# Patient Record
Sex: FEMALE | Race: WHITE | Employment: OTHER | URBAN - METROPOLITAN AREA
[De-identification: names, ages, dates, MRNs, and addresses within clinical notes are randomized per-mention and may not be internally consistent; named-entity substitution may affect disease eponyms.]

---

## 2017-02-10 ENCOUNTER — HOSPITAL ENCOUNTER (OUTPATIENT)
Dept: MAMMOGRAPHY | Age: 72
Discharge: HOME | End: 2017-02-10
Payer: MEDICARE

## 2017-02-10 DIAGNOSIS — Z12.39 BREAST CANCER SCREENING: ICD-10-CM

## 2017-02-10 PROCEDURE — 7025F PT INFOSYS ALARM 4 NXT MAMMO: CPT | Performed by: RADIOLOGY

## 2017-02-10 PROCEDURE — 77063 BREAST TOMOSYNTHESIS BI: CPT | Performed by: RADIOLOGY

## 2017-02-10 PROCEDURE — G0202 SCR MAMMO BI INCL CAD: HCPCS | Performed by: RADIOLOGY

## 2017-02-10 PROCEDURE — G0202 SCR MAMMO BI INCL CAD: HCPCS

## 2018-02-14 ENCOUNTER — HOSPITAL ENCOUNTER (OUTPATIENT)
Dept: MAMMOGRAPHY | Age: 73
Discharge: HOME | End: 2018-02-16
Payer: MEDICARE

## 2018-02-14 DIAGNOSIS — Z12.39 BREAST CANCER SCREENING: ICD-10-CM

## 2018-02-14 PROCEDURE — 77067 SCR MAMMO BI INCL CAD: CPT

## 2018-05-04 ENCOUNTER — APPOINTMENT (OUTPATIENT)
Dept: GENERAL RADIOLOGY | Age: 73
End: 2018-05-04
Payer: MEDICARE

## 2018-05-04 ENCOUNTER — HOSPITAL ENCOUNTER (EMERGENCY)
Age: 73
Discharge: HOME | End: 2018-05-04
Attending: EMERGENCY MEDICINE
Payer: MEDICARE

## 2018-05-04 VITALS
SYSTOLIC BLOOD PRESSURE: 165 MMHG | TEMPERATURE: 98.1 F | BODY MASS INDEX: 31.65 KG/M2 | OXYGEN SATURATION: 96 % | WEIGHT: 172 LBS | HEIGHT: 62 IN | DIASTOLIC BLOOD PRESSURE: 98 MMHG | RESPIRATION RATE: 18 BRPM | HEART RATE: 93 BPM

## 2018-05-04 DIAGNOSIS — M54.31 SCIATICA OF RIGHT SIDE: Primary | ICD-10-CM

## 2018-05-04 PROCEDURE — 99283 EMERGENCY DEPT VISIT LOW MDM: CPT

## 2018-05-04 PROCEDURE — 73502 X-RAY EXAM HIP UNI 2-3 VIEWS: CPT

## 2018-05-04 PROCEDURE — 6370000000 HC RX 637 (ALT 250 FOR IP): Performed by: EMERGENCY MEDICINE

## 2018-05-04 PROCEDURE — 72100 X-RAY EXAM L-S SPINE 2/3 VWS: CPT

## 2018-05-04 RX ORDER — HYDROCODONE BITARTRATE AND ACETAMINOPHEN 5; 325 MG/1; MG/1
1 TABLET ORAL ONCE
Status: COMPLETED | OUTPATIENT
Start: 2018-05-04 | End: 2018-05-04

## 2018-05-04 RX ORDER — LIDOCAINE 50 MG/G
1 PATCH TOPICAL DAILY
Qty: 30 PATCH | Refills: 0 | Status: ON HOLD | OUTPATIENT
Start: 2018-05-04 | End: 2018-07-20

## 2018-05-04 RX ORDER — HYDROCODONE BITARTRATE AND ACETAMINOPHEN 5; 325 MG/1; MG/1
1 TABLET ORAL EVERY 6 HOURS PRN
Qty: 10 TABLET | Refills: 0 | Status: SHIPPED | OUTPATIENT
Start: 2018-05-04 | End: 2018-05-09

## 2018-05-04 RX ADMIN — HYDROCODONE BITARTRATE AND ACETAMINOPHEN 1 TABLET: 5; 325 TABLET ORAL at 08:24

## 2018-05-04 ASSESSMENT — PAIN DESCRIPTION - PAIN TYPE: TYPE: ACUTE PAIN

## 2018-05-04 ASSESSMENT — ENCOUNTER SYMPTOMS
ABDOMINAL PAIN: 0
BACK PAIN: 1

## 2018-05-04 ASSESSMENT — PAIN DESCRIPTION - LOCATION: LOCATION: HIP

## 2018-05-04 ASSESSMENT — PAIN SCALES - GENERAL
PAINLEVEL_OUTOF10: 7
PAINLEVEL_OUTOF10: 4
PAINLEVEL_OUTOF10: 7

## 2018-05-04 ASSESSMENT — PAIN DESCRIPTION - PROGRESSION: CLINICAL_PROGRESSION: GRADUALLY IMPROVING

## 2018-05-04 ASSESSMENT — PAIN DESCRIPTION - ORIENTATION: ORIENTATION: RIGHT

## 2018-07-19 ENCOUNTER — HOSPITAL ENCOUNTER (OUTPATIENT)
Age: 73
Setting detail: OBSERVATION
LOS: 1 days | Discharge: HOME | End: 2018-07-20
Attending: EMERGENCY MEDICINE | Admitting: INTERNAL MEDICINE
Payer: MEDICARE

## 2018-07-19 ENCOUNTER — APPOINTMENT (OUTPATIENT)
Dept: CT IMAGING | Age: 73
End: 2018-07-19
Payer: MEDICARE

## 2018-07-19 DIAGNOSIS — R10.13 EPIGASTRIC PAIN: Primary | ICD-10-CM

## 2018-07-19 DIAGNOSIS — D72.828 OTHER ELEVATED WHITE BLOOD CELL (WBC) COUNT: ICD-10-CM

## 2018-07-19 LAB
-: ABNORMAL
ABSOLUTE EOS #: 0.1 K/UL (ref 0–0.4)
ABSOLUTE IMMATURE GRANULOCYTE: ABNORMAL K/UL (ref 0–0.3)
ABSOLUTE LYMPH #: 2.5 K/UL (ref 1–4.8)
ABSOLUTE MONO #: 1 K/UL (ref 0.1–1.2)
ALBUMIN SERPL-MCNC: 4.6 G/DL (ref 3.5–5.2)
ALBUMIN/GLOBULIN RATIO: 1.5 (ref 1–2.5)
ALP BLD-CCNC: 74 U/L (ref 35–104)
ALT SERPL-CCNC: 113 U/L (ref 5–33)
AMORPHOUS: ABNORMAL
AMYLASE: 46 U/L (ref 28–100)
ANION GAP SERPL CALCULATED.3IONS-SCNC: 14 MMOL/L (ref 9–17)
AST SERPL-CCNC: 70 U/L
BACTERIA: ABNORMAL
BASOPHILS # BLD: 0 % (ref 0–2)
BASOPHILS ABSOLUTE: 0.1 K/UL (ref 0–0.2)
BILIRUB SERPL-MCNC: 0.35 MG/DL (ref 0.3–1.2)
BILIRUBIN DIRECT: 0.1 MG/DL
BILIRUBIN URINE: NEGATIVE
BILIRUBIN, INDIRECT: 0.25 MG/DL (ref 0–1)
BUN BLDV-MCNC: 15 MG/DL (ref 8–23)
BUN/CREAT BLD: ABNORMAL (ref 9–20)
CALCIUM SERPL-MCNC: 9.6 MG/DL (ref 8.6–10.4)
CASTS UA: ABNORMAL /LPF
CHLORIDE BLD-SCNC: 103 MMOL/L (ref 98–107)
CO2: 24 MMOL/L (ref 20–31)
COLOR: YELLOW
COMMENT UA: ABNORMAL
CREAT SERPL-MCNC: 0.85 MG/DL (ref 0.5–0.9)
CRYSTALS, UA: ABNORMAL /HPF
DIFFERENTIAL TYPE: ABNORMAL
EKG ATRIAL RATE: 115 BPM
EKG P AXIS: 34 DEGREES
EKG P-R INTERVAL: 126 MS
EKG Q-T INTERVAL: 314 MS
EKG QRS DURATION: 66 MS
EKG QTC CALCULATION (BAZETT): 434 MS
EKG R AXIS: 5 DEGREES
EKG T AXIS: 44 DEGREES
EKG VENTRICULAR RATE: 115 BPM
EOSINOPHILS RELATIVE PERCENT: 0 % (ref 1–4)
EPITHELIAL CELLS UA: ABNORMAL /HPF (ref 0–5)
GFR AFRICAN AMERICAN: >60 ML/MIN
GFR NON-AFRICAN AMERICAN: >60 ML/MIN
GFR SERPL CREATININE-BSD FRML MDRD: ABNORMAL ML/MIN/{1.73_M2}
GFR SERPL CREATININE-BSD FRML MDRD: ABNORMAL ML/MIN/{1.73_M2}
GLOBULIN: ABNORMAL G/DL (ref 1.5–3.8)
GLUCOSE BLD-MCNC: 114 MG/DL (ref 70–99)
GLUCOSE URINE: NEGATIVE
HCT VFR BLD CALC: 48.3 % (ref 36–46)
HEMOGLOBIN: 16.6 G/DL (ref 12–16)
IMMATURE GRANULOCYTES: ABNORMAL %
KETONES, URINE: NEGATIVE
LACTIC ACID: 1.8 MMOL/L (ref 0.5–2.2)
LEUKOCYTE ESTERASE, URINE: NEGATIVE
LIPASE: 58 U/L (ref 13–60)
LYMPHOCYTES # BLD: 14 % (ref 24–44)
MCH RBC QN AUTO: 32.8 PG (ref 26–34)
MCHC RBC AUTO-ENTMCNC: 34.3 G/DL (ref 31–37)
MCV RBC AUTO: 95.6 FL (ref 80–100)
MONOCYTES # BLD: 5 % (ref 2–11)
MUCUS: ABNORMAL
NITRITE, URINE: NEGATIVE
NRBC AUTOMATED: ABNORMAL PER 100 WBC
OTHER OBSERVATIONS UA: ABNORMAL
PDW BLD-RTO: 13.7 % (ref 12.5–15.4)
PH UA: 6 (ref 5–8)
PLATELET # BLD: 249 K/UL (ref 140–450)
PLATELET ESTIMATE: ABNORMAL
PMV BLD AUTO: 9.1 FL (ref 6–12)
POTASSIUM SERPL-SCNC: 4.1 MMOL/L (ref 3.7–5.3)
PROTEIN UA: NEGATIVE
RBC # BLD: 5.05 M/UL (ref 4–5.2)
RBC # BLD: ABNORMAL 10*6/UL
RBC UA: ABNORMAL /HPF (ref 0–2)
RENAL EPITHELIAL, UA: ABNORMAL /HPF
SEG NEUTROPHILS: 81 % (ref 36–66)
SEGMENTED NEUTROPHILS ABSOLUTE COUNT: 14.3 K/UL (ref 1.8–7.7)
SODIUM BLD-SCNC: 141 MMOL/L (ref 135–144)
SPECIFIC GRAVITY UA: 1.02 (ref 1–1.03)
TOTAL PROTEIN: 7.6 G/DL (ref 6.4–8.3)
TRICHOMONAS: ABNORMAL
TROPONIN INTERP: NORMAL
TROPONIN T: <0.03 NG/ML
TURBIDITY: CLEAR
URINE HGB: ABNORMAL
UROBILINOGEN, URINE: NORMAL
WBC # BLD: 17.9 K/UL (ref 3.5–11)
WBC # BLD: ABNORMAL 10*3/UL
WBC UA: ABNORMAL /HPF (ref 0–5)
YEAST: ABNORMAL

## 2018-07-19 PROCEDURE — 36415 COLL VENOUS BLD VENIPUNCTURE: CPT

## 2018-07-19 PROCEDURE — 80076 HEPATIC FUNCTION PANEL: CPT

## 2018-07-19 PROCEDURE — 6360000002 HC RX W HCPCS: Performed by: EMERGENCY MEDICINE

## 2018-07-19 PROCEDURE — 84484 ASSAY OF TROPONIN QUANT: CPT

## 2018-07-19 PROCEDURE — 83690 ASSAY OF LIPASE: CPT

## 2018-07-19 PROCEDURE — 82150 ASSAY OF AMYLASE: CPT

## 2018-07-19 PROCEDURE — 81001 URINALYSIS AUTO W/SCOPE: CPT

## 2018-07-19 PROCEDURE — 99284 EMERGENCY DEPT VISIT MOD MDM: CPT

## 2018-07-19 PROCEDURE — 96374 THER/PROPH/DIAG INJ IV PUSH: CPT

## 2018-07-19 PROCEDURE — 96375 TX/PRO/DX INJ NEW DRUG ADDON: CPT

## 2018-07-19 PROCEDURE — 2580000003 HC RX 258: Performed by: EMERGENCY MEDICINE

## 2018-07-19 PROCEDURE — 85025 COMPLETE CBC W/AUTO DIFF WBC: CPT

## 2018-07-19 PROCEDURE — 83605 ASSAY OF LACTIC ACID: CPT

## 2018-07-19 PROCEDURE — 80048 BASIC METABOLIC PNL TOTAL CA: CPT

## 2018-07-19 PROCEDURE — 74176 CT ABD & PELVIS W/O CONTRAST: CPT

## 2018-07-19 PROCEDURE — 93005 ELECTROCARDIOGRAM TRACING: CPT

## 2018-07-19 RX ORDER — MORPHINE SULFATE 2 MG/ML
2 INJECTION, SOLUTION INTRAMUSCULAR; INTRAVENOUS ONCE
Status: COMPLETED | OUTPATIENT
Start: 2018-07-19 | End: 2018-07-19

## 2018-07-19 RX ORDER — 0.9 % SODIUM CHLORIDE 0.9 %
1000 INTRAVENOUS SOLUTION INTRAVENOUS ONCE
Status: COMPLETED | OUTPATIENT
Start: 2018-07-19 | End: 2018-07-20

## 2018-07-19 RX ORDER — ONDANSETRON 2 MG/ML
4 INJECTION INTRAMUSCULAR; INTRAVENOUS ONCE
Status: COMPLETED | OUTPATIENT
Start: 2018-07-19 | End: 2018-07-19

## 2018-07-19 RX ADMIN — SODIUM CHLORIDE 1000 ML: 9 INJECTION, SOLUTION INTRAVENOUS at 22:01

## 2018-07-19 RX ADMIN — ONDANSETRON 4 MG: 2 INJECTION INTRAMUSCULAR; INTRAVENOUS at 22:02

## 2018-07-19 RX ADMIN — MORPHINE SULFATE 2 MG: 2 INJECTION, SOLUTION INTRAMUSCULAR; INTRAVENOUS at 22:02

## 2018-07-19 ASSESSMENT — PAIN DESCRIPTION - DESCRIPTORS: DESCRIPTORS: SHARP

## 2018-07-19 ASSESSMENT — ENCOUNTER SYMPTOMS
VOMITING: 1
ABDOMINAL PAIN: 1
NAUSEA: 1

## 2018-07-19 ASSESSMENT — PAIN DESCRIPTION - LOCATION
LOCATION: ABDOMEN

## 2018-07-19 ASSESSMENT — PAIN DESCRIPTION - ORIENTATION: ORIENTATION: UPPER

## 2018-07-19 ASSESSMENT — PAIN SCALES - GENERAL
PAINLEVEL_OUTOF10: 10
PAINLEVEL_OUTOF10: 10

## 2018-07-19 ASSESSMENT — PAIN DESCRIPTION - PAIN TYPE
TYPE: ACUTE PAIN

## 2018-07-19 ASSESSMENT — PAIN DESCRIPTION - PROGRESSION: CLINICAL_PROGRESSION: GRADUALLY IMPROVING

## 2018-07-20 ENCOUNTER — APPOINTMENT (OUTPATIENT)
Dept: GENERAL RADIOLOGY | Age: 73
End: 2018-07-20
Payer: MEDICARE

## 2018-07-20 VITALS
BODY MASS INDEX: 33.31 KG/M2 | RESPIRATION RATE: 22 BRPM | TEMPERATURE: 97.8 F | WEIGHT: 181 LBS | HEIGHT: 62 IN | SYSTOLIC BLOOD PRESSURE: 112 MMHG | DIASTOLIC BLOOD PRESSURE: 60 MMHG | HEART RATE: 69 BPM | OXYGEN SATURATION: 98 %

## 2018-07-20 PROBLEM — K29.70 GASTRITIS WITHOUT BLEEDING: Status: ACTIVE | Noted: 2018-07-20

## 2018-07-20 PROBLEM — K52.9 GASTROENTERITIS: Status: ACTIVE | Noted: 2018-07-20

## 2018-07-20 LAB
ALBUMIN SERPL-MCNC: 3.9 G/DL (ref 3.5–5.2)
ALBUMIN/GLOBULIN RATIO: 1.6 (ref 1–2.5)
ALP BLD-CCNC: 53 U/L (ref 35–104)
ALT SERPL-CCNC: 83 U/L (ref 5–33)
AMYLASE: 40 U/L (ref 28–100)
ANION GAP SERPL CALCULATED.3IONS-SCNC: 12 MMOL/L (ref 9–17)
AST SERPL-CCNC: 45 U/L
BILIRUB SERPL-MCNC: 0.74 MG/DL (ref 0.3–1.2)
BUN BLDV-MCNC: 13 MG/DL (ref 8–23)
BUN/CREAT BLD: ABNORMAL (ref 9–20)
CALCIUM SERPL-MCNC: 8.7 MG/DL (ref 8.6–10.4)
CHLORIDE BLD-SCNC: 106 MMOL/L (ref 98–107)
CO2: 27 MMOL/L (ref 20–31)
CREAT SERPL-MCNC: 0.69 MG/DL (ref 0.5–0.9)
DATE, STOOL #1: NORMAL
DATE, STOOL #2: NORMAL
DATE, STOOL #3: NORMAL
GFR AFRICAN AMERICAN: >60 ML/MIN
GFR NON-AFRICAN AMERICAN: >60 ML/MIN
GFR SERPL CREATININE-BSD FRML MDRD: ABNORMAL ML/MIN/{1.73_M2}
GFR SERPL CREATININE-BSD FRML MDRD: ABNORMAL ML/MIN/{1.73_M2}
GLUCOSE BLD-MCNC: 91 MG/DL (ref 70–99)
HCT VFR BLD CALC: 44 % (ref 36–46)
HEMOCCULT SP1 STL QL: NEGATIVE
HEMOCCULT SP2 STL QL: NORMAL
HEMOCCULT SP3 STL QL: NORMAL
HEMOGLOBIN: 14.9 G/DL (ref 12–16)
LIPASE: 56 U/L (ref 13–60)
MAGNESIUM: 2.2 MG/DL (ref 1.6–2.6)
MCH RBC QN AUTO: 32.8 PG (ref 26–34)
MCHC RBC AUTO-ENTMCNC: 33.8 G/DL (ref 31–37)
MCV RBC AUTO: 97 FL (ref 80–100)
NRBC AUTOMATED: NORMAL PER 100 WBC
PDW BLD-RTO: 14.3 % (ref 12.5–15.4)
PHOSPHORUS: 2.8 MG/DL (ref 2.6–4.5)
PLATELET # BLD: 218 K/UL (ref 140–450)
PMV BLD AUTO: 8.9 FL (ref 6–12)
POTASSIUM SERPL-SCNC: 4.3 MMOL/L (ref 3.7–5.3)
RBC # BLD: 4.53 M/UL (ref 4–5.2)
SODIUM BLD-SCNC: 145 MMOL/L (ref 135–144)
TIME, STOOL #1: 745
TIME, STOOL #2: NORMAL
TIME, STOOL #3: NORMAL
TOTAL PROTEIN: 6.4 G/DL (ref 6.4–8.3)
WBC # BLD: 9.7 K/UL (ref 3.5–11)

## 2018-07-20 PROCEDURE — 6370000000 HC RX 637 (ALT 250 FOR IP): Performed by: NURSE PRACTITIONER

## 2018-07-20 PROCEDURE — 82150 ASSAY OF AMYLASE: CPT

## 2018-07-20 PROCEDURE — 6360000002 HC RX W HCPCS: Performed by: NURSE PRACTITIONER

## 2018-07-20 PROCEDURE — 2500000003 HC RX 250 WO HCPCS: Performed by: NURSE PRACTITIONER

## 2018-07-20 PROCEDURE — 36415 COLL VENOUS BLD VENIPUNCTURE: CPT

## 2018-07-20 PROCEDURE — 83690 ASSAY OF LIPASE: CPT

## 2018-07-20 PROCEDURE — 82272 OCCULT BLD FECES 1-3 TESTS: CPT

## 2018-07-20 PROCEDURE — 2580000003 HC RX 258: Performed by: NURSE PRACTITIONER

## 2018-07-20 PROCEDURE — 83735 ASSAY OF MAGNESIUM: CPT

## 2018-07-20 PROCEDURE — 80053 COMPREHEN METABOLIC PANEL: CPT

## 2018-07-20 PROCEDURE — 94762 N-INVAS EAR/PLS OXIMTRY CONT: CPT

## 2018-07-20 PROCEDURE — 84100 ASSAY OF PHOSPHORUS: CPT

## 2018-07-20 PROCEDURE — 99236 HOSP IP/OBS SAME DATE HI 85: CPT | Performed by: CLINICAL NURSE SPECIALIST

## 2018-07-20 PROCEDURE — 71046 X-RAY EXAM CHEST 2 VIEWS: CPT

## 2018-07-20 PROCEDURE — 85027 COMPLETE CBC AUTOMATED: CPT

## 2018-07-20 PROCEDURE — 96372 THER/PROPH/DIAG INJ SC/IM: CPT

## 2018-07-20 RX ORDER — SODIUM CHLORIDE 0.9 % (FLUSH) 0.9 %
10 SYRINGE (ML) INJECTION EVERY 12 HOURS SCHEDULED
Status: DISCONTINUED | OUTPATIENT
Start: 2018-07-20 | End: 2018-07-20 | Stop reason: HOSPADM

## 2018-07-20 RX ORDER — POTASSIUM CHLORIDE 20 MEQ/1
40 TABLET, EXTENDED RELEASE ORAL PRN
Status: DISCONTINUED | OUTPATIENT
Start: 2018-07-20 | End: 2018-07-20 | Stop reason: HOSPADM

## 2018-07-20 RX ORDER — LIDOCAINE 50 MG/G
1 PATCH TOPICAL DAILY
Status: DISCONTINUED | OUTPATIENT
Start: 2018-07-20 | End: 2018-07-20 | Stop reason: HOSPADM

## 2018-07-20 RX ORDER — SODIUM CHLORIDE 0.9 % (FLUSH) 0.9 %
10 SYRINGE (ML) INJECTION PRN
Status: DISCONTINUED | OUTPATIENT
Start: 2018-07-20 | End: 2018-07-20 | Stop reason: HOSPADM

## 2018-07-20 RX ORDER — POTASSIUM CHLORIDE 20MEQ/15ML
40 LIQUID (ML) ORAL PRN
Status: DISCONTINUED | OUTPATIENT
Start: 2018-07-20 | End: 2018-07-20 | Stop reason: HOSPADM

## 2018-07-20 RX ORDER — ALENDRONATE SODIUM 35 MG/1
35 TABLET ORAL
Status: DISCONTINUED | OUTPATIENT
Start: 2018-07-20 | End: 2018-07-20

## 2018-07-20 RX ORDER — CETIRIZINE HYDROCHLORIDE 10 MG/1
10 TABLET ORAL DAILY
Status: DISCONTINUED | OUTPATIENT
Start: 2018-07-20 | End: 2018-07-20 | Stop reason: HOSPADM

## 2018-07-20 RX ORDER — NICOTINE 21 MG/24HR
1 PATCH, TRANSDERMAL 24 HOURS TRANSDERMAL DAILY PRN
Status: DISCONTINUED | OUTPATIENT
Start: 2018-07-20 | End: 2018-07-20

## 2018-07-20 RX ORDER — POTASSIUM CHLORIDE 7.45 MG/ML
10 INJECTION INTRAVENOUS PRN
Status: DISCONTINUED | OUTPATIENT
Start: 2018-07-20 | End: 2018-07-20 | Stop reason: HOSPADM

## 2018-07-20 RX ORDER — BISACODYL 10 MG
10 SUPPOSITORY, RECTAL RECTAL DAILY PRN
Status: DISCONTINUED | OUTPATIENT
Start: 2018-07-20 | End: 2018-07-20 | Stop reason: HOSPADM

## 2018-07-20 RX ORDER — ONDANSETRON 2 MG/ML
4 INJECTION INTRAMUSCULAR; INTRAVENOUS EVERY 6 HOURS PRN
Status: DISCONTINUED | OUTPATIENT
Start: 2018-07-20 | End: 2018-07-20 | Stop reason: HOSPADM

## 2018-07-20 RX ORDER — MAGNESIUM SULFATE 1 G/100ML
1 INJECTION INTRAVENOUS PRN
Status: DISCONTINUED | OUTPATIENT
Start: 2018-07-20 | End: 2018-07-20 | Stop reason: HOSPADM

## 2018-07-20 RX ORDER — DEXTROSE, SODIUM CHLORIDE, AND POTASSIUM CHLORIDE 5; .45; .15 G/100ML; G/100ML; G/100ML
INJECTION INTRAVENOUS CONTINUOUS
Status: DISCONTINUED | OUTPATIENT
Start: 2018-07-20 | End: 2018-07-20

## 2018-07-20 RX ADMIN — CALCIUM CARBONATE 600 MG (1,500 MG)-VITAMIN D3 400 UNIT TABLET 1 TABLET: at 07:57

## 2018-07-20 RX ADMIN — ENOXAPARIN SODIUM 40 MG: 100 INJECTION SUBCUTANEOUS at 07:58

## 2018-07-20 RX ADMIN — POTASSIUM CHLORIDE, DEXTROSE MONOHYDRATE AND SODIUM CHLORIDE: 150; 5; 450 INJECTION, SOLUTION INTRAVENOUS at 02:05

## 2018-07-20 RX ADMIN — Medication 10 ML: at 07:58

## 2018-07-20 RX ADMIN — Medication 10 ML: at 02:05

## 2018-07-20 ASSESSMENT — PAIN SCALES - GENERAL
PAINLEVEL_OUTOF10: 0
PAINLEVEL_OUTOF10: 0

## 2018-07-20 NOTE — ED PROVIDER NOTES
Take 1 tablet by mouth every 7 days. Take once/week on empty stomach in AM w/ glass of water. Do not eat, drink, or lie down for 30 minutes. CALCIUM CARBONATE-VIT D-MIN (CALCIUM 600 + MINERALS) 600-200 MG-UNIT TABS    Take 1 tablet by mouth 2 times daily. LIDOCAINE (LIDODERM) 5 %    Place 1 patch onto the skin daily 12 hours on, 12 hours off. LORATADINE (CLARITIN) 10 MG TABLET    Take 10 mg by mouth as needed. MULTIPLE VITAMIN (MULTIVITAMIN PO)    Take 1 capsule by mouth daily. TRETINOIN, FACIAL WRINKLES, (REFISSA) 0.05 % CREA    Apply topically       ALLERGIES     is allergic to azithromycin and sulfa antibiotics. FAMILY HISTORY     has no family status information on file. family history is not on file. SOCIAL HISTORY      reports that she has never smoked. She has never used smokeless tobacco. She reports that she drinks alcohol. She reports that she does not use drugs. PHYSICAL EXAM    (up to 7 for level 4, 8 or more for level 5)   INITIAL VITALS:  height is 5' 2\" (1.575 m) and weight is 79.4 kg (175 lb). Her oral temperature is 98.1 °F (36.7 °C). Her blood pressure is 164/77 (abnormal) and her pulse is 79. Her respiration is 18 and oxygen saturation is 95%. Physical Exam   Constitutional: She appears well-developed and well-nourished. HENT:   Head: Normocephalic and atraumatic. Eyes: Conjunctivae are normal.   Neck: Normal range of motion. Neck supple. Cardiovascular:   Tachycardic rate that is regular   Pulmonary/Chest: Effort normal and breath sounds normal. No respiratory distress. She has no wheezes. She has no rales. Abdominal: Soft. Hypoactive bowel sounds with tenderness in the epigastric region with some  voluntary guarding   Musculoskeletal: Normal range of motion. She exhibits no edema or tenderness. Neurological: She is alert. No focal deficits are appreciated   Skin: Skin is warm and dry. No rash noted.    Psychiatric: She has a normal mood and affect. Nursing note and vitals reviewed. DIFFERENTIAL DIAGNOSIS/ MDM:     I will give the patient IV fluids check labs and EKG, CT of the abdomen and pelvis    DIAGNOSTIC RESULTS     EKG: All EKG's are interpreted by the Emergency Department Physician who either signs or Co-signs this chart in the absence of a cardiologist.      Interpreted by Buster Love MD     Rhythm: Sinus tachycardia  Rate: 115  Axis: 5  Ectopy: none  Conduction: normal  ST Segments: no acute change  T Waves: no acute change  Q Waves: none    Clinical Impression: Sinus tachycardia with no acute changes/normal EKG. No acute infarction/ischemia noted. RADIOLOGY:   Non-plain film images such as CT, Ultrasound and MRI are read by the radiologist. Plain radiographic images are visualized and the radiologist interpretations are reviewed as follows:         Interpretation per the Radiologist below, if available at the time of this note:    Neeta (Final result)   Result time 07/19/18 22:44:16   Final result by Rosalia Melendez MD (07/19/18 22:44:16)                Impression:    1.  No acute findings identified in the abdomen and pelvis. Narrative:    EXAMINATION:  CT OF THE ABDOMEN AND PELVIS WITHOUT CONTRAST 7/19/2018 10:17 pm    TECHNIQUE:  CT of the abdomen and pelvis was performed without the administration of  intravenous contrast. Multiplanar reformatted images are provided for review. Dose modulation, iterative reconstruction, and/or weight based adjustment of  the mA/kV was utilized to reduce the radiation dose to as low as reasonably  achievable. COMPARISON:  None. HISTORY:  ORDERING SYSTEM PROVIDED HISTORY: abdominal pain  TECHNOLOGIST PROVIDED HISTORY:  WITHOUT Contrast  Ordering Physician Provided Reason for Exam: epigastric pain  Acuity: Acute  Type of Exam: Initial  Additional signs and symptoms: emesis    FINDINGS:  Lower Chest: No acute findings of the lung bases.     Organs: REPORTED NONE       Labs did show an elevated white blood cell count. CT shows no acute findings    EMERGENCY DEPARTMENT COURSE:   Vitals:    Vitals:    07/19/18 2142 07/19/18 2145 07/19/18 2153 07/19/18 2228   BP: 104/78 (!) 164/77 (!) 164/77    Pulse: 134   79   Resp: 20   18   Temp: 98.1 °F (36.7 °C)      TempSrc: Oral  Oral    SpO2: 97% 96%  95%   Weight: 79.4 kg (175 lb)      Height: 5' 2\" (1.575 m)        -------------------------  BP: (!) 164/77, Temp: 98.1 °F (36.7 °C), Pulse: 79, Resp: 18      RE-EVALUATION:  10:48 PM: The patient states that her pain is improved. It is now a 6 out of 10 where previously it was a 9 out of 10. She refuses any further pain medications. As she states that she is starting to feel better  11:40 PM: The patient states that her pain had dropped all the way to a 1 out of 10 and now it is starting to come back. It is 4 out of 10 and now is dropping again to about a 3 out of 10. The patient with these waxing and waning. The pain. The elevated white blood cell count, I do feel warrants admission. The patient is agreeable to staying here at [address], so this point, I will contact my hospitalist for admission  I have reviewed the disposition diagnosis with the patient and or their family/guardian. I have answered their questions and given discharge instructions. They voiced understanding of these instructions and did not have any further questions or complaints. CONSULTS:  I spoke with my hospitalist who is kind enough to admit the patient to her service. PROCEDURES:  None    FINAL IMPRESSION      1. Epigastric pain    2. Other elevated white blood cell (WBC) count          DISPOSITION/PLAN   DISPOSITION        CONDITION ON DISPOSITION:   Stable    PATIENT REFERRED TO:  No follow-up provider specified.     DISCHARGE MEDICATIONS:  New Prescriptions    No medications on file       (Please note that portions of this note were completed with a voice

## 2018-07-20 NOTE — PLAN OF CARE
Problem: Falls - Risk of:  Goal: Absence of physical injury  Absence of physical injury   Fall assessment preformed. Bed in low locked position with call light and tray table within reach. Education given. Will continue to monitor.

## 2018-07-20 NOTE — CARE COORDINATION
Case Management Initial Discharge Plan  Riya Brar,         Readmission Risk              Risk of Unplanned Readmission:        9               Met with:patient to discuss discharge plans.    Information verified: address, contacts, phone number, , insurance Yes  PCP: Tamara Dejesus MD  Date of last visit: 2 months     Insurance Provider: Leonard Middleton     Discharge Planning    Living Arrangements:  Spouse/Significant Other   Support Systems:  Spouse/Significant Other, Children, Lucia 4 has 1 stories  2 stairs to climb to get into front door, 0stairs to climb to reach second floor  Location of bedroom/bathroom in home main     Patient able to perform ADL's:Independent    Current Services (outpatient & in home) none   DME equipment: none  DME provider: none     Pharmacy: [facility] Medications:  No  Does patient want to participate in local refill/ meds to beds program?  N/A    Potential Assistance Needed:  N/A    Patient agreeable to home care: No  [place] of choice provided:  n/a    Prior SNF/Rehab Placement and Facility: no   Agreeable to SNF/Rehab: No  C[place] of choice provided: n/a   Evaluation: no    Expected Discharge date:  18  Patient expects to be discharged to:  home  Follow Up Appointment: Best Day/ Time:      Transportation provider:    Transportation arrangements needed for discharge: No    Discharge Plan: home         Electronically signed by Delphine Hartmann RN on 18 at 1:04 PM

## 2018-07-21 NOTE — H&P
[facility]    HISTORY AND PHYSICAL EXAMINATION            Date:   7/20/2018  Patient name:  Jesús Guadalupe  Date of admission:  7/19/2018  9:42 PM  MRN:   [id number]  Account:  [de-identified]  YOB: 1945  PCP:    Tanisha Dickinson MD  Room:   304/304-01  Code Status:    Prior    Chief Complaint:     Chief Complaint   Patient presents with    Abdominal Pain     upper epigastric pain        History Obtained From:     patient, electronic medical record    History of Present Illness: The patient had a rather sudden onset of epigastric pain with episodes of nausea and vomiting. She denied fever or chills or recent sick contact. She would not to lunch that day and had a turkey wrap. She and her  both ate the same dinner. She did not have any diarrhea. There was no blood in the vomit. The vomiting and pain were very severe and she rated it as a 10 and could get no relief. The pain was sharp and constant and went across to her mid abdomen. CT scan done in the emergency department was negative for any acute findings. Urine was sent for culture. White count was elevated at 17.9.     Past Medical History:     Past Medical History:   Diagnosis Date    Abnormal mammogram 2008    Thickening under left breast and saw surgeon-benign findings    Abnormal Pap smear 7640W    uncertain dx; had cryo    Allergy     seasonal    Cervical stenosis (uterine cervix)     Drug effect     sulfa-\"boils\"    Eczema 1980s    Hearing aid worn     right ear 2013    History of bone density study 1/13    penia- hip -1.7    Osteopenia     Rectocele     Vaginitis, atrophic         Past Surgical History:     Past Surgical History:   Procedure Laterality Date    BUNIONECTOMY  8/10 and 3/11    bilateral    CERVIX LESION DESTRUCTION  Late 80's    doesn't know actual diagnosis, had frequent f/u    COLONOSCOPY  12/23/02, 1/14/13    normal    hearing changes, runny nose, throat pain  RESPIRATORY:  negative for shortness of breath, cough, congestion, wheezing. CARDIOVASCULAR:  negative for chest pain, palpitations. GASTROINTESTINAL:  + nausea, vomiting, abdominal pain,  no diarrhea, constipation, GENITOURINARY:  negative for difficulty of urination, burning with urination, frequency   INTEGUMENT:  negative for rash, skin lesions, easy bruising   HEMATOLOGIC/LYMPHATIC:  negative for swelling/edema   ALLERGIC/IMMUNOLOGIC:  negative for urticaria , itching  ENDOCRINE:  negative increase in drinking, increase in urination, hot or cold intolerance  MUSCULOSKELETAL:  negative joint pains, muscle aches, swelling of joints  NEUROLOGICAL:  negative for headaches, dizziness, lightheadedness, numbness, pain, tingling extremities  BEHAVIOR/PSYCH:  negative for depression, anxiety    Physical Exam:   /60   Pulse 69   Temp 97.8 °F (36.6 °C) (Oral)   Resp 22   Ht 5' 2\" (1.575 m)   Wt 181 lb (82.1 kg)   SpO2 98%   BMI 33.10 kg/m²   Temp (24hrs), Av.2 °F (36.8 °C), Min:97.8 °F (36.6 °C), Max:98.8 °F (37.1 °C)    No results for input(s): POCGLU in the last 72 hours. Intake/Output Summary (Last 24 hours) at 18 2220  Last data filed at 18 0610   Gross per 24 hour   Intake              490 ml   Output                0 ml   Net              490 ml       General Appearance:  alert, well appearing, and in no acute distress  Mental status: oriented to person, place, and time with normal affect  Head:  normocephalic, atraumatic.   Eye: no icterus, redness, pupils equal and reactive, extraocular eye movements intact, conjunctiva clear  Ear: normal external ear, no discharge, hearing intact  Nose:  no drainage noted  Mouth: mucous membranes moist, thrush  Neck: supple, no carotid bruits, thyroid not palpable  Lungs: Bilateral equal air entry, clear to ausculation, no wheezing, rales or rhonchi, normal effort  Cardiovascular: normal rate, regular rhythm, no murmur, gallop, rub. Abdomen: Soft, nontender, nondistended, normal bowel sounds, no hepatomegaly or splenomegaly.   Patient denies pain since her admission and has had no episodes of vomiting  Neurologic: There are no new focal motor or sensory deficits, normal muscle tone and bulk, no abnormal sensation, normal speech, cranial nerves II through XII grossly intact  Skin: No gross lesions, rashes, bruising or bleeding on exposed skin area  Extremities:  peripheral pulses palpable, no pedal edema or calf pain with palpation  Psych: normal affect     Investigations:      Laboratory Testing:  Recent Results (from the past 24 hour(s))   Urinalysis Reflex to Culture    Collection Time: 07/19/18 10:55 PM   Result Value Ref Range    Color, UA YELLOW YEL    Turbidity UA CLEAR CLEAR    Glucose, Ur NEGATIVE NEG    Bilirubin Urine NEGATIVE NEG    Ketones, Urine NEGATIVE NEG    Specific Gravity, UA 1.020 1.005 - 1.030    Urine Hgb TRACE (A) NEG    pH, UA 6.0 5.0 - 8.0    Protein, UA NEGATIVE NEG    Urobilinogen, Urine Normal NORM    Nitrite, Urine NEGATIVE NEG    Leukocyte Esterase, Urine NEGATIVE NEG    Urinalysis Comments NOT REPORTED    Microscopic Urinalysis    Collection Time: 07/19/18 10:55 PM   Result Value Ref Range    -          WBC, UA 0 TO 2 0 - 5 /HPF    RBC, UA 2 TO 5 0 - 2 /HPF    Casts UA NOT REPORTED /LPF    Crystals UA NOT REPORTED NONE /HPF    Epithelial Cells UA 5 TO 10 0 - 5 /HPF    Renal Epithelial, Urine NOT REPORTED 0 /HPF    Bacteria, UA None NONE    Mucus, UA NOT REPORTED NONE    Trichomonas, UA NOT REPORTED NONE    Amorphous, UA NOT REPORTED NONE    Other Observations UA (A) NREQ     Utilizing a urinalysis as the only screening method to exclude a potential    Yeast, UA NOT REPORTED NONE   Blood Occult Stool Screen #1    Collection Time: 07/20/18  7:45 AM   Result Value Ref Range    Occult Blood, Stool #1 NEGATIVE NEG    Date, Stool #1 2,601,491     Time, Stool #1 745     Occult Blood, Stool #2 NOT REPORTED NEG    Date, Stool #2 NOT REPORTED     Time, Stool #2 NOT REPORTED     Occult Blood, Stool #3 NOT REPORTED NEG    Date, Stool #3 NOT REPORTED     Time, Stool #3 NOT REPORTED    Amylase    Collection Time: 07/20/18 11:07 AM   Result Value Ref Range    Amylase 40 28 - 100 U/L   CBC    Collection Time: 07/20/18 11:07 AM   Result Value Ref Range    WBC 9.7 3.5 - 11.0 k/uL    RBC 4.53 4.0 - 5.2 m/uL    Hemoglobin 14.9 12.0 - 16.0 g/dL    Hematocrit 44.0 36 - 46 %    MCV 97.0 80 - 100 fL    MCH 32.8 26 - 34 pg    MCHC 33.8 31 - 37 g/dL    RDW 14.3 12.5 - 15.4 %    Platelets 831 359 - 117 k/uL    MPV 8.9 6.0 - 12.0 fL    NRBC Automated NOT REPORTED per 100 WBC   Comprehensive Metabolic Panel w/ Reflex to MG    Collection Time: 07/20/18 11:07 AM   Result Value Ref Range    Glucose 91 70 - 99 mg/dL    BUN 13 8 - 23 mg/dL    CREATININE 0.69 0.50 - 0.90 mg/dL    Bun/Cre Ratio NOT REPORTED 9 - 20    Calcium 8.7 8.6 - 10.4 mg/dL    Sodium 145 (H) 135 - 144 mmol/L    Potassium 4.3 3.7 - 5.3 mmol/L    Chloride 106 98 - 107 mmol/L    CO2 27 20 - 31 mmol/L    Anion Gap 12 9 - 17 mmol/L    Alkaline Phosphatase 53 35 - 104 U/L    ALT 83 (H) 5 - 33 U/L    AST 45 (H) <32 U/L    Total Bilirubin 0.74 0.3 - 1.2 mg/dL    Total Protein 6.4 6.4 - 8.3 g/dL    Alb 3.9 3.5 - 5.2 g/dL    Albumin/Globulin Ratio 1.6 1.0 - 2.5    GFR Non-African American >60 >60 mL/min    GFR African American >60 >60 mL/min    GFR Comment          GFR Staging NOT REPORTED    Lipase    Collection Time: 07/20/18 11:07 AM   Result Value Ref Range    Lipase 56 13 - 60 U/L   Magnesium    Collection Time: 07/20/18 11:07 AM   Result Value Ref Range    Magnesium 2.2 1.6 - 2.6 mg/dL   Phosphorus    Collection Time: 07/20/18 11:07 AM   Result Value Ref Range    Phosphorus 2.8 2.6 - 4.5 mg/dL       Imaging/Diagnostics:    CT OF THE ABDOMEN AND PELVIS WITHOUT CONTRAST 7/19/2018 10:17 pm  Impression:   1.  No acute findings identified in the abdomen and pelvis. Assessment :      Primary Problem  Gastritis without bleeding    Active Hospital Problems    Diagnosis Date Noted    Gastritis without bleeding [K29.70] 07/20/2018    Gastroenteritis [K52.9] 07/20/2018    Epigastric pain [R10.13]     Leucocytosis [D72.829]        Plan:     Patient status Admit as observation in the  Med/Surge    1. We'll get a chest x-ray, just to make sure there is no aspiration pneumonia  2.  Advance diet, if she does well we will likely discharge her today    Consultations:   None        ANTONIO Tavera - CNS  7/20/2018  10:20 PM    Copy sent to Dr. Luís Pagan MD

## 2018-07-21 NOTE — DISCHARGE SUMMARY
[facility]    Discharge Summary     Patient ID: Tammie Martinez  :     MRN: [id number]     ACCOUNT:  [de-identified]   Patient's PCP: Chaitanya Abad MD  Admit Date: 2018   Discharge Date: 2018     Length of Stay: 1  Code Status:  Prior  Admitting Physician: Bianka Nesbitt, DO  Discharge Physician: ANTONIO Franklin CNS     Active Discharge Diagnoses:     Hospital Problem Lists:  Principal Problem:    Gastritis without bleeding  Active Problems:    Gastroenteritis    Epigastric pain    Leucocytosis  Resolved Problems:    * No resolved hospital problems. *      Admission Condition:  fair     Discharged Condition: good    Hospital Stay:     Hospital Course: The patient had a rather sudden onset of epigastric pain with episodes of nausea and vomiting. She denied fever or chills or recent sick contact. She would not to lunch that day and had a turkey wrap. She and her  both ate the same dinner. She did not have any diarrhea. There was no blood in the vomit. The vomiting and pain were very severe and she rated it as a 10 and could get no relief. The pain was sharp and constant and went across to her mid abdomen. CT scan done in the emergency department was negative for any acute findings. Urine was sent for culture. White count was elevated at 17.9 and liver function studies were mildly elevated. Patient's symptoms completely resolved her diet was advanced and she was discharged with no new medications.   On the day of discharge her white count was back to normal and liver function studies were trending down    Significant therapeutic interventions: IV fluids    Significant Diagnostic Studies:   Labs / Micro:  Recent Results (from the past 168 hour(s))   Amylase    Collection Time: 18  8:58 PM   Result Value Ref Range    Amylase 46 28 - 100 U/L   Lipase    Collection Time: 18  8:58 PM   Result without the administration of intravenous contrast. Multiplanar reformatted images are provided for review. Dose modulation, iterative reconstruction, and/or weight based adjustment of the mA/kV was utilized to reduce the radiation dose to as low as reasonably achievable. COMPARISON: None. HISTORY: ORDERING SYSTEM PROVIDED HISTORY: abdominal pain TECHNOLOGIST PROVIDED HISTORY: WITHOUT Contrast Ordering Physician Provided Reason for Exam: epigastric pain Acuity: Acute Type of Exam: Initial Additional signs and symptoms: emesis FINDINGS: Lower Chest: No acute findings of the lung bases. Organs: Assessment of bowel and organs is limited without IV contrast. Liver, pancreas, adrenal glands and spleen are without acute findings. No hydronephrosis. Mildly enlarged portacaval lymph node. Tabatha Cristiano GI/Bowel: No bowel obstruction. No free air. No focal enterocolitis. Appendix is not well seen. No inflammatory changes in the right lower quadrant. Pelvis: No acute findings Peritoneum/Retroperitoneum: No abdominal aortic aneurysm. Evaluation of vascular structures is limited without intravenous contrast. Bones/Soft Tissues: No acute osseous abnormality is identified. Mild multilevel disc loss and endplate spur formation. 1.  No acute findings identified in the abdomen and pelvis. Xr Chest Standard (2 Vw)    Result Date: 7/20/2018  EXAMINATION: TWO VIEWS OF THE CHEST 7/20/2018 10:27 am COMPARISON: None. HISTORY: ORDERING SYSTEM PROVIDED HISTORY: left lower crackles TECHNOLOGIST PROVIDED HISTORY: Reason for exam:->left lower crackles Ordering Physician Provided Reason for Exam: crackles on lower left lungs Acuity: Acute Type of Exam: Initial FINDINGS: The lungs are without acute focal process. There is no effusion or pneumothorax. The cardiomediastinal silhouette is without acute process. The osseous structures are without acute process. No acute process.          Consultations:    Consults:     Final Specialist Recommendations/Findings:   None      The patient was seen and examined on day of discharge and this discharge summary is in conjunction with any daily progress note from day of discharge. Discharge plan:     Disposition: Home    Physician Follow Up:     Josh Girard MD  [facility]. [address] [place] [address]  [phone]             Requiring Further Evaluation/Follow Up POST HOSPITALIZATION/Incidental Findings: Urine for culture sent 7/19/2018, slight elevation in liver function studies    Diet: diabetic diet    Activity: As tolerated    Instructions to Patient: Follow up with your primary care doctor    Discharge Medications:      Medication List      CONTINUE taking these medications    alendronate 35 MG tablet  Commonly known as:  FOSAMAX  Take 1 tablet by mouth every 7 days. Take once/week on empty stomach in AM w/ glass of water. Do not eat, drink, or lie down for 30 minutes. CALCIUM 600 + MINERALS 600-200 MG-UNIT Tabs     CLARITIN 10 MG tablet  Generic drug:  loratadine     MULTIVITAMIN PO     REFISSA 0.05 % Crea  Generic drug:  Tretinoin (Facial Wrinkles)        STOP taking these medications    acetaminophen 500 MG tablet  Commonly known as:  TYLENOL     lidocaine 5 %  Commonly known as:  LIDODERM            Time Spent on discharge is  20 mins in patient examination, evaluation, counseling as well as medication reconciliation, prescriptions for required medications, discharge plan and follow up. Electronically signed by   ANTONIO Whitaker  7/20/2018  10:22 PM      Thank you Dr. Elaine Renteria MD for the opportunity to be involved in this patient's care.

## 2018-07-26 ENCOUNTER — HOSPITAL ENCOUNTER (OUTPATIENT)
Dept: ULTRASOUND IMAGING | Age: 73
Discharge: HOME | End: 2018-07-28
Payer: MEDICARE

## 2018-07-26 DIAGNOSIS — R74.8 ELEVATED LIVER ENZYMES: ICD-10-CM

## 2018-07-26 PROCEDURE — 76705 ECHO EXAM OF ABDOMEN: CPT

## 2019-02-18 ENCOUNTER — HOSPITAL ENCOUNTER (OUTPATIENT)
Dept: MAMMOGRAPHY | Age: 74
Discharge: HOME | End: 2019-02-20
Payer: MEDICARE

## 2019-02-18 DIAGNOSIS — Z12.39 BREAST CANCER SCREENING: ICD-10-CM

## 2019-02-18 PROCEDURE — 77063 BREAST TOMOSYNTHESIS BI: CPT

## 2019-03-06 ENCOUNTER — HOSPITAL ENCOUNTER (OUTPATIENT)
Dept: WOMENS IMAGING | Age: 74
Discharge: HOME | End: 2019-03-08
Payer: MEDICARE

## 2019-03-06 DIAGNOSIS — R92.8 ABNORMAL MAMMOGRAM: ICD-10-CM

## 2019-03-06 PROCEDURE — G0279 TOMOSYNTHESIS, MAMMO: HCPCS

## 2019-03-06 PROCEDURE — 76642 ULTRASOUND BREAST LIMITED: CPT

## 2019-09-06 ENCOUNTER — HOSPITAL ENCOUNTER (OUTPATIENT)
Dept: WOMENS IMAGING | Age: 74
Discharge: HOME | End: 2019-09-08
Payer: MEDICARE

## 2019-09-06 DIAGNOSIS — Z09 FOLLOW UP: ICD-10-CM

## 2019-09-06 DIAGNOSIS — R92.8 ABNORMAL MAMMOGRAM: ICD-10-CM

## 2019-09-06 PROCEDURE — 76642 ULTRASOUND BREAST LIMITED: CPT

## 2019-09-06 PROCEDURE — G0279 TOMOSYNTHESIS, MAMMO: HCPCS

## 2024-02-12 ENCOUNTER — APPOINTMENT (OUTPATIENT)
Dept: CT IMAGING | Age: 79
End: 2024-02-12
Payer: MEDICARE

## 2024-02-12 ENCOUNTER — HOSPITAL ENCOUNTER (OUTPATIENT)
Age: 79
Setting detail: OBSERVATION
Discharge: HOME | End: 2024-02-13
Attending: STUDENT IN AN ORGANIZED HEALTH CARE EDUCATION/TRAINING PROGRAM | Admitting: STUDENT IN AN ORGANIZED HEALTH CARE EDUCATION/TRAINING PROGRAM
Payer: MEDICARE

## 2024-02-12 DIAGNOSIS — R10.13 EPIGASTRIC PAIN: Primary | ICD-10-CM

## 2024-02-12 DIAGNOSIS — R19.7 NAUSEA VOMITING AND DIARRHEA: ICD-10-CM

## 2024-02-12 DIAGNOSIS — R11.2 NAUSEA VOMITING AND DIARRHEA: ICD-10-CM

## 2024-02-12 PROBLEM — K85.90 ACUTE PANCREATITIS, UNSPECIFIED COMPLICATION STATUS, UNSPECIFIED PANCREATITIS TYPE: Status: ACTIVE | Noted: 2024-02-12

## 2024-02-12 LAB
ALBUMIN SERPL-MCNC: 4.4 G/DL (ref 3.5–5.2)
ALBUMIN/GLOB SERPL: 1.6 {RATIO} (ref 1–2.5)
ALP SERPL-CCNC: 72 U/L (ref 35–104)
ALT SERPL-CCNC: 28 U/L (ref 5–33)
ANION GAP SERPL CALCULATED.3IONS-SCNC: 12 MMOL/L (ref 9–17)
AST SERPL-CCNC: 24 U/L
BACTERIA URNS QL MICRO: ABNORMAL
BASOPHILS # BLD: 0 K/UL (ref 0–0.2)
BASOPHILS NFR BLD: 0 % (ref 0–2)
BILIRUB SERPL-MCNC: 0.6 MG/DL (ref 0.3–1.2)
BILIRUB UR QL STRIP: NEGATIVE
BUN SERPL-MCNC: 18 MG/DL (ref 8–23)
CALCIUM SERPL-MCNC: 9.3 MG/DL (ref 8.6–10.4)
CHARACTER UR: ABNORMAL
CHLORIDE SERPL-SCNC: 102 MMOL/L (ref 98–107)
CLARITY UR: CLEAR
CO2 SERPL-SCNC: 26 MMOL/L (ref 20–31)
COLOR UR: YELLOW
CREAT SERPL-MCNC: 0.9 MG/DL (ref 0.5–0.9)
EOSINOPHIL # BLD: 0 K/UL (ref 0–0.4)
EOSINOPHILS RELATIVE PERCENT: 0 % (ref 1–4)
EPI CELLS #/AREA URNS HPF: ABNORMAL /HPF (ref 0–5)
ERYTHROCYTE [DISTWIDTH] IN BLOOD BY AUTOMATED COUNT: 13.7 % (ref 12.5–15.4)
FLUAV AG SPEC QL: NEGATIVE
FLUBV AG SPEC QL: NEGATIVE
GFR SERPL CREATININE-BSD FRML MDRD: >60 ML/MIN/1.73M2
GLUCOSE SERPL-MCNC: 175 MG/DL (ref 70–99)
GLUCOSE UR STRIP-MCNC: NEGATIVE MG/DL
HCT VFR BLD AUTO: 48.1 % (ref 36–46)
HGB BLD-MCNC: 16.4 G/DL (ref 12–16)
HGB UR QL STRIP.AUTO: ABNORMAL
KETONES UR STRIP-MCNC: NEGATIVE MG/DL
LEUKOCYTE ESTERASE UR QL STRIP: NEGATIVE
LIPASE SERPL-CCNC: 207 U/L (ref 13–60)
LYMPHOCYTES NFR BLD: 0.9 K/UL (ref 1–4.8)
LYMPHOCYTES RELATIVE PERCENT: 5 % (ref 24–44)
MCH RBC QN AUTO: 31.9 PG (ref 26–34)
MCHC RBC AUTO-ENTMCNC: 34 G/DL (ref 31–37)
MCV RBC AUTO: 93.8 FL (ref 80–100)
MONOCYTES NFR BLD: 0.8 K/UL (ref 0.1–1.2)
MONOCYTES NFR BLD: 5 % (ref 2–11)
NEUTROPHILS NFR BLD: 90 % (ref 36–66)
NEUTS SEG NFR BLD: 15.6 K/UL (ref 1.8–7.7)
NITRITE UR QL STRIP: NEGATIVE
PH UR STRIP: 6 [PH] (ref 5–8)
PLATELET # BLD AUTO: 274 K/UL (ref 140–450)
PMV BLD AUTO: 8.4 FL (ref 6–12)
POTASSIUM SERPL-SCNC: 4.3 MMOL/L (ref 3.7–5.3)
PROT SERPL-MCNC: 7.2 G/DL (ref 6.4–8.3)
PROT UR STRIP-MCNC: NEGATIVE MG/DL
RBC # BLD AUTO: 5.13 M/UL (ref 4–5.2)
RBC #/AREA URNS HPF: ABNORMAL /HPF (ref 0–2)
SARS-COV-2 RDRP RESP QL NAA+PROBE: NOT DETECTED
SODIUM SERPL-SCNC: 140 MMOL/L (ref 135–144)
SP GR UR STRIP: 1.02 (ref 1–1.03)
SPECIMEN DESCRIPTION: NORMAL
TROPONIN I SERPL HS-MCNC: 8 NG/L (ref 0–14)
UROBILINOGEN UR STRIP-ACNC: NORMAL EU/DL (ref 0–1)
WBC #/AREA URNS HPF: ABNORMAL /HPF (ref 0–5)
WBC OTHER # BLD: 17.4 K/UL (ref 3.5–11)

## 2024-02-12 PROCEDURE — 80053 COMPREHEN METABOLIC PANEL: CPT

## 2024-02-12 PROCEDURE — 99285 EMERGENCY DEPT VISIT HI MDM: CPT

## 2024-02-12 PROCEDURE — 96375 TX/PRO/DX INJ NEW DRUG ADDON: CPT

## 2024-02-12 PROCEDURE — A4216 STERILE WATER/SALINE, 10 ML: HCPCS | Performed by: STUDENT IN AN ORGANIZED HEALTH CARE EDUCATION/TRAINING PROGRAM

## 2024-02-12 PROCEDURE — 36415 COLL VENOUS BLD VENIPUNCTURE: CPT

## 2024-02-12 PROCEDURE — 87804 INFLUENZA ASSAY W/OPTIC: CPT

## 2024-02-12 PROCEDURE — 2580000003 HC RX 258: Performed by: STUDENT IN AN ORGANIZED HEALTH CARE EDUCATION/TRAINING PROGRAM

## 2024-02-12 PROCEDURE — 96374 THER/PROPH/DIAG INJ IV PUSH: CPT

## 2024-02-12 PROCEDURE — 6360000002 HC RX W HCPCS: Performed by: STUDENT IN AN ORGANIZED HEALTH CARE EDUCATION/TRAINING PROGRAM

## 2024-02-12 PROCEDURE — 85025 COMPLETE CBC W/AUTO DIFF WBC: CPT

## 2024-02-12 PROCEDURE — 87449 NOS EACH ORGANISM AG IA: CPT

## 2024-02-12 PROCEDURE — 74176 CT ABD & PELVIS W/O CONTRAST: CPT

## 2024-02-12 PROCEDURE — 87324 CLOSTRIDIUM AG IA: CPT

## 2024-02-12 PROCEDURE — G0378 HOSPITAL OBSERVATION PER HR: HCPCS

## 2024-02-12 PROCEDURE — 87635 SARS-COV-2 COVID-19 AMP PRB: CPT

## 2024-02-12 PROCEDURE — 81001 URINALYSIS AUTO W/SCOPE: CPT

## 2024-02-12 PROCEDURE — 99222 1ST HOSP IP/OBS MODERATE 55: CPT | Performed by: STUDENT IN AN ORGANIZED HEALTH CARE EDUCATION/TRAINING PROGRAM

## 2024-02-12 PROCEDURE — 6370000000 HC RX 637 (ALT 250 FOR IP): Performed by: STUDENT IN AN ORGANIZED HEALTH CARE EDUCATION/TRAINING PROGRAM

## 2024-02-12 PROCEDURE — C9113 INJ PANTOPRAZOLE SODIUM, VIA: HCPCS | Performed by: STUDENT IN AN ORGANIZED HEALTH CARE EDUCATION/TRAINING PROGRAM

## 2024-02-12 PROCEDURE — 96361 HYDRATE IV INFUSION ADD-ON: CPT

## 2024-02-12 PROCEDURE — 84484 ASSAY OF TROPONIN QUANT: CPT

## 2024-02-12 PROCEDURE — 96372 THER/PROPH/DIAG INJ SC/IM: CPT

## 2024-02-12 PROCEDURE — 83690 ASSAY OF LIPASE: CPT

## 2024-02-12 PROCEDURE — 2500000003 HC RX 250 WO HCPCS: Performed by: STUDENT IN AN ORGANIZED HEALTH CARE EDUCATION/TRAINING PROGRAM

## 2024-02-12 PROCEDURE — 93005 ELECTROCARDIOGRAM TRACING: CPT | Performed by: STUDENT IN AN ORGANIZED HEALTH CARE EDUCATION/TRAINING PROGRAM

## 2024-02-12 RX ORDER — SODIUM CHLORIDE, SODIUM LACTATE, POTASSIUM CHLORIDE, CALCIUM CHLORIDE 600; 310; 30; 20 MG/100ML; MG/100ML; MG/100ML; MG/100ML
INJECTION, SOLUTION INTRAVENOUS CONTINUOUS
Status: DISCONTINUED | OUTPATIENT
Start: 2024-02-12 | End: 2024-02-13 | Stop reason: HOSPADM

## 2024-02-12 RX ORDER — POTASSIUM CHLORIDE 20 MEQ/1
40 TABLET, EXTENDED RELEASE ORAL PRN
Status: DISCONTINUED | OUTPATIENT
Start: 2024-02-12 | End: 2024-02-13 | Stop reason: HOSPADM

## 2024-02-12 RX ORDER — DILTIAZEM HYDROCHLORIDE 120 MG/1
120 CAPSULE, EXTENDED RELEASE ORAL DAILY
COMMUNITY

## 2024-02-12 RX ORDER — ONDANSETRON 2 MG/ML
4 INJECTION INTRAMUSCULAR; INTRAVENOUS ONCE
Status: COMPLETED | OUTPATIENT
Start: 2024-02-12 | End: 2024-02-12

## 2024-02-12 RX ORDER — MAGNESIUM SULFATE IN WATER 40 MG/ML
2000 INJECTION, SOLUTION INTRAVENOUS PRN
Status: DISCONTINUED | OUTPATIENT
Start: 2024-02-12 | End: 2024-02-13 | Stop reason: HOSPADM

## 2024-02-12 RX ORDER — ONDANSETRON 4 MG/1
4 TABLET, ORALLY DISINTEGRATING ORAL EVERY 8 HOURS PRN
Status: DISCONTINUED | OUTPATIENT
Start: 2024-02-12 | End: 2024-02-13 | Stop reason: HOSPADM

## 2024-02-12 RX ORDER — DICYCLOMINE HYDROCHLORIDE 10 MG/1
10 CAPSULE ORAL 3 TIMES DAILY
COMMUNITY

## 2024-02-12 RX ORDER — METOPROLOL TARTRATE 50 MG/1
50 TABLET, FILM COATED ORAL 2 TIMES DAILY
Status: DISCONTINUED | OUTPATIENT
Start: 2024-02-12 | End: 2024-02-13 | Stop reason: HOSPADM

## 2024-02-12 RX ORDER — ENOXAPARIN SODIUM 100 MG/ML
40 INJECTION SUBCUTANEOUS DAILY
Status: DISCONTINUED | OUTPATIENT
Start: 2024-02-12 | End: 2024-02-13 | Stop reason: HOSPADM

## 2024-02-12 RX ORDER — POLYETHYLENE GLYCOL 3350 17 G/17G
17 POWDER, FOR SOLUTION ORAL DAILY PRN
Status: DISCONTINUED | OUTPATIENT
Start: 2024-02-12 | End: 2024-02-13 | Stop reason: HOSPADM

## 2024-02-12 RX ORDER — SODIUM CHLORIDE 0.9 % (FLUSH) 0.9 %
5-40 SYRINGE (ML) INJECTION EVERY 12 HOURS SCHEDULED
Status: DISCONTINUED | OUTPATIENT
Start: 2024-02-12 | End: 2024-02-13 | Stop reason: HOSPADM

## 2024-02-12 RX ORDER — LISINOPRIL AND HYDROCHLOROTHIAZIDE 25; 20 MG/1; MG/1
1 TABLET ORAL DAILY
COMMUNITY

## 2024-02-12 RX ORDER — MORPHINE SULFATE 2 MG/ML
2 INJECTION, SOLUTION INTRAMUSCULAR; INTRAVENOUS EVERY 4 HOURS PRN
Status: DISPENSED | OUTPATIENT
Start: 2024-02-12 | End: 2024-02-13

## 2024-02-12 RX ORDER — ONDANSETRON 2 MG/ML
4 INJECTION INTRAMUSCULAR; INTRAVENOUS EVERY 6 HOURS PRN
Status: DISCONTINUED | OUTPATIENT
Start: 2024-02-12 | End: 2024-02-13 | Stop reason: HOSPADM

## 2024-02-12 RX ORDER — 0.9 % SODIUM CHLORIDE 0.9 %
1000 INTRAVENOUS SOLUTION INTRAVENOUS ONCE
Status: COMPLETED | OUTPATIENT
Start: 2024-02-12 | End: 2024-02-12

## 2024-02-12 RX ORDER — ACETAMINOPHEN 325 MG/1
650 TABLET ORAL EVERY 6 HOURS PRN
Status: DISCONTINUED | OUTPATIENT
Start: 2024-02-12 | End: 2024-02-13 | Stop reason: HOSPADM

## 2024-02-12 RX ORDER — SODIUM CHLORIDE 9 MG/ML
INJECTION, SOLUTION INTRAVENOUS PRN
Status: DISCONTINUED | OUTPATIENT
Start: 2024-02-12 | End: 2024-02-13 | Stop reason: HOSPADM

## 2024-02-12 RX ORDER — POTASSIUM CHLORIDE 7.45 MG/ML
10 INJECTION INTRAVENOUS PRN
Status: DISCONTINUED | OUTPATIENT
Start: 2024-02-12 | End: 2024-02-13 | Stop reason: HOSPADM

## 2024-02-12 RX ORDER — METOPROLOL SUCCINATE 50 MG/1
50 TABLET, EXTENDED RELEASE ORAL DAILY
COMMUNITY

## 2024-02-12 RX ORDER — ACETAMINOPHEN 650 MG/1
650 SUPPOSITORY RECTAL EVERY 6 HOURS PRN
Status: DISCONTINUED | OUTPATIENT
Start: 2024-02-12 | End: 2024-02-13 | Stop reason: HOSPADM

## 2024-02-12 RX ORDER — DOXAZOSIN MESYLATE 4 MG/1
4 TABLET ORAL NIGHTLY
COMMUNITY

## 2024-02-12 RX ORDER — SODIUM CHLORIDE 0.9 % (FLUSH) 0.9 %
5-40 SYRINGE (ML) INJECTION PRN
Status: DISCONTINUED | OUTPATIENT
Start: 2024-02-12 | End: 2024-02-13 | Stop reason: HOSPADM

## 2024-02-12 RX ORDER — KETOROLAC TROMETHAMINE 15 MG/ML
15 INJECTION, SOLUTION INTRAMUSCULAR; INTRAVENOUS ONCE
Status: COMPLETED | OUTPATIENT
Start: 2024-02-12 | End: 2024-02-12

## 2024-02-12 RX ADMIN — SODIUM CHLORIDE, POTASSIUM CHLORIDE, SODIUM LACTATE AND CALCIUM CHLORIDE: 600; 310; 30; 20 INJECTION, SOLUTION INTRAVENOUS at 10:07

## 2024-02-12 RX ADMIN — ENOXAPARIN SODIUM 40 MG: 100 INJECTION SUBCUTANEOUS at 10:00

## 2024-02-12 RX ADMIN — PANTOPRAZOLE SODIUM 40 MG: 40 INJECTION, POWDER, FOR SOLUTION INTRAVENOUS at 10:00

## 2024-02-12 RX ADMIN — KETOROLAC TROMETHAMINE 15 MG: 15 INJECTION, SOLUTION INTRAMUSCULAR; INTRAVENOUS at 06:51

## 2024-02-12 RX ADMIN — SODIUM CHLORIDE, POTASSIUM CHLORIDE, SODIUM LACTATE AND CALCIUM CHLORIDE: 600; 310; 30; 20 INJECTION, SOLUTION INTRAVENOUS at 19:57

## 2024-02-12 RX ADMIN — MORPHINE SULFATE 2 MG: 2 INJECTION, SOLUTION INTRAMUSCULAR; INTRAVENOUS at 10:00

## 2024-02-12 RX ADMIN — METOPROLOL TARTRATE 50 MG: 50 TABLET, FILM COATED ORAL at 19:58

## 2024-02-12 RX ADMIN — METOPROLOL TARTRATE 50 MG: 50 TABLET, FILM COATED ORAL at 11:59

## 2024-02-12 RX ADMIN — FAMOTIDINE 20 MG: 10 INJECTION, SOLUTION INTRAVENOUS at 06:51

## 2024-02-12 RX ADMIN — SODIUM CHLORIDE, POTASSIUM CHLORIDE, SODIUM LACTATE AND CALCIUM CHLORIDE: 600; 310; 30; 20 INJECTION, SOLUTION INTRAVENOUS at 14:57

## 2024-02-12 RX ADMIN — SODIUM CHLORIDE 1000 ML: 9 INJECTION, SOLUTION INTRAVENOUS at 06:54

## 2024-02-12 RX ADMIN — ONDANSETRON 4 MG: 2 INJECTION INTRAMUSCULAR; INTRAVENOUS at 06:50

## 2024-02-12 RX ADMIN — SODIUM CHLORIDE, PRESERVATIVE FREE 10 ML: 5 INJECTION INTRAVENOUS at 10:00

## 2024-02-12 ASSESSMENT — PAIN SCALES - GENERAL
PAINLEVEL_OUTOF10: 10
PAINLEVEL_OUTOF10: 2
PAINLEVEL_OUTOF10: 8
PAINLEVEL_OUTOF10: 8

## 2024-02-12 ASSESSMENT — PAIN DESCRIPTION - LOCATION
LOCATION: ABDOMEN

## 2024-02-12 ASSESSMENT — PAIN - FUNCTIONAL ASSESSMENT
PAIN_FUNCTIONAL_ASSESSMENT: 0-10
PAIN_FUNCTIONAL_ASSESSMENT: ACTIVITIES ARE NOT PREVENTED

## 2024-02-12 ASSESSMENT — PAIN DESCRIPTION - DESCRIPTORS
DESCRIPTORS: DISCOMFORT
DESCRIPTORS: DISCOMFORT

## 2024-02-12 ASSESSMENT — PAIN DESCRIPTION - FREQUENCY: FREQUENCY: CONTINUOUS

## 2024-02-12 ASSESSMENT — PAIN DESCRIPTION - ORIENTATION
ORIENTATION: MID
ORIENTATION: MID

## 2024-02-12 ASSESSMENT — PAIN DESCRIPTION - PAIN TYPE: TYPE: ACUTE PAIN

## 2024-02-12 NOTE — PLAN OF CARE
Problem: Discharge Planning  Goal: Discharge to home or other facility with appropriate resources  Outcome: Progressing  Flowsheets  Taken 2/12/2024 0932  Discharge to home or other facility with appropriate resources:   Identify barriers to discharge with patient and caregiver   Arrange for needed discharge resources and transportation as appropriate   Identify discharge learning needs (meds, wound care, etc)  Taken 2/12/2024 0929  Discharge to home or other facility with appropriate resources:   Identify barriers to discharge with patient and caregiver   Arrange for needed discharge resources and transportation as appropriate   Identify discharge learning needs (meds, wound care, etc)   Refer to discharge planning if patient needs post-hospital services based on physician order or complex needs related to functional status, cognitive ability or social support system     Problem: Pain  Goal: Verbalizes/displays adequate comfort level or baseline comfort level  Outcome: Progressing  Flowsheets  Taken 2/12/2024 1143  Verbalizes/displays adequate comfort level or baseline comfort level:   Encourage patient to monitor pain and request assistance   Assess pain using appropriate pain scale   Administer analgesics based on type and severity of pain and evaluate response   Implement non-pharmacological measures as appropriate and evaluate response   Notify Licensed Independent Practitioner if interventions unsuccessful or patient reports new pain  Taken 2/12/2024 0929  Verbalizes/displays adequate comfort level or baseline comfort level:   Encourage patient to monitor pain and request assistance   Assess pain using appropriate pain scale   Administer analgesics based on type and severity of pain and evaluate response   Implement non-pharmacological measures as appropriate and evaluate response   Notify Licensed Independent Practitioner if interventions unsuccessful or patient reports new pain

## 2024-02-12 NOTE — ED PROVIDER NOTES
FACULTY SIGN-OUT  ADDENDUM     Care of this patient was assumed from Dr. Alex Quesada.  The patient was seen for Abdominal Pain, Emesis, and Diarrhea  .  The patient's initial evaluation and plan have been discussed with the prior provider who initially evaluated the patient.  Nursing Notes, Past Medical Hx, Past Surgical Hx, Social Hx, Allergies, and Family Hx were all reviewed.        CHIEF COMPLAINT       Chief Complaint   Patient presents with    Abdominal Pain    Emesis    Diarrhea         PAST MEDICAL HISTORY    has a past medical history of Abnormal mammogram, Abnormal Pap smear, Allergy, Cervical stenosis (uterine cervix), Drug effect, Eczema, Hearing aid worn, History of bone density study, Osteopenia, Rectocele, and Vaginitis, atrophic.    SURGICAL HISTORY      has a past surgical history that includes Skin tag removal (11 4 2010); Bunionectomy (8/10 and 3/11); cyst removal (2 4 2003); cyst removal (); Tonsillectomy (8 1962); Endometrial biopsy (7 20 1998); other surgical history (2011); Upper gastrointestinal endoscopy (1/13); cyst removal; Cervix lesion destruction (Late 80's); Colonoscopy (12/23/02, 1/14/13); and Colonoscopy (02/2018).    CURRENT MEDICATIONS       Previous Medications    ALENDRONATE (FOSAMAX) 35 MG TABLET    Take 1 tablet by mouth every 7 days. Take once/week on empty stomach in AM w/ glass of water. Do not eat, drink, or lie down for 30 minutes.    APIXABAN (ELIQUIS) 5 MG TABS TABLET    Take by mouth 2 times daily    CALCIUM CARBONATE-VIT D-MIN (CALCIUM 600 + MINERALS) 600-200 MG-UNIT TABS    Take 1 tablet by mouth 2 times daily.      DILTIAZEM (CARDIZEM 12 HR) 120 MG EXTENDED RELEASE CAPSULE    Take 1 capsule by mouth daily    DOXAZOSIN (CARDURA) 4 MG TABLET    Take 1 tablet by mouth nightly    LISINOPRIL-HYDROCHLOROTHIAZIDE (PRINZIDE;ZESTORETIC) 20-25 MG PER TABLET    Take 1 tablet by mouth daily    LORATADINE (CLARITIN) 10 MG TABLET    Take 1 tablet by mouth daily

## 2024-02-12 NOTE — ED TRIAGE NOTES
C/o increased abd pain with N/V/D, onset approx 0030. Denies any recent travel and (+) ill contacts. Denies fever. Admits to cough. Hx gastritis. Took Imodium and Pepto PTA with no relief. Denies any blood in stool. Rates pain 10/10.

## 2024-02-12 NOTE — ED PROVIDER NOTES
[facility] EMERGENCY DEPARTMENT  EMERGENCY DEPARTMENT ENCOUNTER      Pt Name: Katja Isidro  MRN: [id number]  Birthdate 1945  Date of evaluation: 2/12/2024  Provider: Alex Quesada DO    CHIEF COMPLAINT       Chief Complaint   Patient presents with    Abdominal Pain    Emesis    Diarrhea         HISTORY OF PRESENT ILLNESS   (Location/Symptom, Timing/Onset, Context/Setting, Quality, Duration, Modifying Factors, Severity)  Note limiting factors.   Katja Isidro is a 78 y.o. female who presents to the emergency department with abdominal pain, nausea, vomiting, diarrhea.  Patient states that tonight she began having epigastric abdominal pain and several episodes of nausea, vomiting, diarrhea.  She also notes some congestion and a cough.  Denies any fevers, sore throat, chest pain or difficulty breathing, coffee-ground emesis, bloody stools, urinary symptoms, known sick contacts.    HPI    Nursing Notes were reviewed.    REVIEW OF SYSTEMS    (2-9 systems for level 4, 10 or more for level 5)     Review of Systems   Constitutional:  Negative for chills and fever.   HENT:  Positive for congestion. Negative for facial swelling and sore throat.    Eyes:  Negative for visual disturbance.   Respiratory:  Positive for cough. Negative for shortness of breath and wheezing.    Cardiovascular:  Negative for chest pain, palpitations and leg swelling.   Gastrointestinal:  Positive for abdominal pain, diarrhea, nausea and vomiting. Negative for blood in stool.   Genitourinary:  Negative for dysuria and hematuria.   Musculoskeletal:  Negative for back pain and neck pain.   Skin:  Negative for rash.   Neurological:  Negative for syncope, weakness, numbness and headaches.   Hematological:  Does not bruise/bleed easily.       Except as noted above the remainder of the review of systems was reviewed and negative.       PAST MEDICAL HISTORY     Past Medical History:   Diagnosis Date    Abnormal mammogram 2008     sounds. No wheezing or rales.   Abdominal:      General: There is no distension.      Palpations: Abdomen is soft.      Tenderness: There is abdominal tenderness. There is no guarding.      Comments: Mild epigastric tenderness without peritoneal findings.  Negative Hurst sign.   Musculoskeletal:         General: No tenderness.      Cervical back: Normal range of motion and neck supple.      Right lower leg: No edema.      Left lower leg: No edema.   Skin:     General: Skin is warm and dry.      Findings: No rash.   Neurological:      General: No focal deficit present.      Mental Status: She is alert and oriented to person, place, and time.         DIAGNOSTIC RESULTS     EKG: All EKG's are interpreted by the Emergency Department Physician who either signs or Co-signs this chart in the absence of a cardiologist.      RADIOLOGY:   Non-plain film images such as CT, Ultrasound and MRI are read by the radiologist. Plain radiographic images are visualized and preliminarily interpreted by the emergency physician with the below findings:      Interpretation per the Radiologist below, if available at the time of this note:    CT ABDOMEN PELVIS WO CONTRAST Additional Contrast? None    (Results Pending)         ED BEDSIDE ULTRASOUND:   Performed by ED Physician - none    LABS:  Labs Reviewed   CBC WITH AUTO DIFFERENTIAL - Abnormal; Notable for the following components:       Result Value    WBC 17.4 (*)     Hemoglobin 16.4 (*)     Hematocrit 48.1 (*)     Neutrophils % 90 (*)     Lymphocytes % 5 (*)     Eosinophils % 0 (*)     Neutrophils Absolute 15.60 (*)     Lymphocytes Absolute 0.90 (*)     All other components within normal limits   RAPID INFLUENZA A/B ANTIGENS   COVID-19, RAPID   COMPREHENSIVE METABOLIC PANEL   LIPASE   TROPONIN       All other labs were within normal range or not returned as of this dictation.    EMERGENCY DEPARTMENT COURSE and DIFFERENTIAL DIAGNOSIS/MDM:   Vitals:    Vitals:    02/12/24 0636 02/12/24

## 2024-02-12 NOTE — H&P
[facility]  Office: [phone]  Grady Ozuna DO, Victoriano Day DO, Julio C Brown DO, Hans Nesbitt DO, Earl Aguilar MD, Sylvia Trimble MD, Anai Rucker MD, Azeb Suarez MD,  Jaiden Landaverde MD, Sandra Morton MD, Nhan Faith MD,  Moreno Rendon DO, Kwan Nicole MD, Meek Bashir MD, Joshua Ozuna DO, Harper Bobo MD,  Jack Oneil DO, Juli Murry MD, Livia Guy MD, Kitty Briceño MD, Gladys Jordan MD,  Miguel Proctor MD, Burton Allen MD, Ana Prieto MD, Agusto Joyce MD, Vivek Patino MD, Mark Anthony Rodriguez MD, Daquan Valladares DO, Oli Hand DO, Mary Hammer MD,  Marco Hedrick MD, Shirley Waterhouse, CNP,  Ev Caldwell CNP, Matthew Mosley, CNP,  Casi Sparks, CALISTA, Ely Sevilla, CNP, Rama Schmidt, CNP, Rosie Schofield CNP, Trudy Alexandre, CNP, Theresa Najera, CNP, Kavita Anderson, PA-C, Adriana Mazariegos PA-C, Rosalba Mathur, CNP, Jovanna Yeh, CNP, Regla Rodriguez, CNS, Chiquis Mayfield, CNP, Maritza Montero CNP, Tracy Schwab, CNP         [facility]   IN-PATIENT SERVICE   [facility]    HISTORY AND PHYSICAL EXAMINATION            Date:   2/12/2024  Patient name:  Katja Isidro  Date of admission:  2/12/2024  6:34 AM  MRN:   [id number]  Account:  [id number]  YOB: 1945  PCP:    Kassy Diez MD  Room:   [address] [place] [address]  Code Status:    Full Code    Chief Complaint:     Chief Complaint   Patient presents with    Abdominal Pain    Emesis    Diarrhea       History Obtained From:     patient    History of Present Illness:     Katja Isidro is a 78 y.o. Non- / non  female who presents with Abdominal Pain, Emesis, and Diarrhea   and is admitted to the hospital for the management of Acute pancreatitis, unspecified complication status, unspecified pancreatitis type.    78-year-old female with past medical history of hypertension on metoprolol presented to the hospital due to worsening abdominal pain that

## 2024-02-13 VITALS
OXYGEN SATURATION: 95 % | BODY MASS INDEX: 32.2 KG/M2 | HEART RATE: 79 BPM | RESPIRATION RATE: 16 BRPM | SYSTOLIC BLOOD PRESSURE: 145 MMHG | WEIGHT: 175 LBS | TEMPERATURE: 98.1 F | HEIGHT: 62 IN | DIASTOLIC BLOOD PRESSURE: 67 MMHG

## 2024-02-13 LAB
ANION GAP SERPL CALCULATED.3IONS-SCNC: 7 MMOL/L (ref 9–17)
BUN SERPL-MCNC: 14 MG/DL (ref 8–23)
C DIFF GDH + TOXINS A+B STL QL IA.RAPID: NEGATIVE
CALCIUM SERPL-MCNC: 8.4 MG/DL (ref 8.6–10.4)
CHLORIDE SERPL-SCNC: 107 MMOL/L (ref 98–107)
CO2 SERPL-SCNC: 27 MMOL/L (ref 20–31)
CREAT SERPL-MCNC: 0.8 MG/DL (ref 0.5–0.9)
EKG ATRIAL RATE: 78 BPM
EKG P AXIS: 44 DEGREES
EKG P-R INTERVAL: 146 MS
EKG Q-T INTERVAL: 388 MS
EKG QRS DURATION: 66 MS
EKG QTC CALCULATION (BAZETT): 442 MS
EKG R AXIS: 12 DEGREES
EKG T AXIS: 43 DEGREES
EKG VENTRICULAR RATE: 78 BPM
ERYTHROCYTE [DISTWIDTH] IN BLOOD BY AUTOMATED COUNT: 13.5 % (ref 12.5–15.4)
GFR SERPL CREATININE-BSD FRML MDRD: >60 ML/MIN/1.73M2
GLUCOSE SERPL-MCNC: 100 MG/DL (ref 70–99)
HCT VFR BLD AUTO: 38.8 % (ref 36–46)
HGB BLD-MCNC: 13.3 G/DL (ref 12–16)
MCH RBC QN AUTO: 32.2 PG (ref 26–34)
MCHC RBC AUTO-ENTMCNC: 34.2 G/DL (ref 31–37)
MCV RBC AUTO: 94.3 FL (ref 80–100)
PLATELET # BLD AUTO: 177 K/UL (ref 140–450)
PMV BLD AUTO: 8.4 FL (ref 6–12)
POTASSIUM SERPL-SCNC: 4.5 MMOL/L (ref 3.7–5.3)
PROCALCITONIN SERPL-MCNC: 0.14 NG/ML (ref 0–0.09)
RBC # BLD AUTO: 4.12 M/UL (ref 4–5.2)
SODIUM SERPL-SCNC: 141 MMOL/L (ref 135–144)
SPECIMEN DESCRIPTION: NORMAL
WBC OTHER # BLD: 6.2 K/UL (ref 3.5–11)

## 2024-02-13 PROCEDURE — 99232 SBSQ HOSP IP/OBS MODERATE 35: CPT | Performed by: HOSPITALIST

## 2024-02-13 PROCEDURE — 80048 BASIC METABOLIC PNL TOTAL CA: CPT

## 2024-02-13 PROCEDURE — 84145 PROCALCITONIN (PCT): CPT

## 2024-02-13 PROCEDURE — 2580000003 HC RX 258: Performed by: STUDENT IN AN ORGANIZED HEALTH CARE EDUCATION/TRAINING PROGRAM

## 2024-02-13 PROCEDURE — APPSS30 APP SPLIT SHARED TIME 16-30 MINUTES

## 2024-02-13 PROCEDURE — 96361 HYDRATE IV INFUSION ADD-ON: CPT

## 2024-02-13 PROCEDURE — 96372 THER/PROPH/DIAG INJ SC/IM: CPT

## 2024-02-13 PROCEDURE — C9113 INJ PANTOPRAZOLE SODIUM, VIA: HCPCS | Performed by: STUDENT IN AN ORGANIZED HEALTH CARE EDUCATION/TRAINING PROGRAM

## 2024-02-13 PROCEDURE — 6370000000 HC RX 637 (ALT 250 FOR IP): Performed by: STUDENT IN AN ORGANIZED HEALTH CARE EDUCATION/TRAINING PROGRAM

## 2024-02-13 PROCEDURE — 85027 COMPLETE CBC AUTOMATED: CPT

## 2024-02-13 PROCEDURE — A4216 STERILE WATER/SALINE, 10 ML: HCPCS | Performed by: STUDENT IN AN ORGANIZED HEALTH CARE EDUCATION/TRAINING PROGRAM

## 2024-02-13 PROCEDURE — 96376 TX/PRO/DX INJ SAME DRUG ADON: CPT

## 2024-02-13 PROCEDURE — G0378 HOSPITAL OBSERVATION PER HR: HCPCS

## 2024-02-13 PROCEDURE — 36415 COLL VENOUS BLD VENIPUNCTURE: CPT

## 2024-02-13 PROCEDURE — 6360000002 HC RX W HCPCS: Performed by: STUDENT IN AN ORGANIZED HEALTH CARE EDUCATION/TRAINING PROGRAM

## 2024-02-13 RX ADMIN — METOPROLOL TARTRATE 50 MG: 50 TABLET, FILM COATED ORAL at 10:20

## 2024-02-13 RX ADMIN — PANTOPRAZOLE SODIUM 40 MG: 40 INJECTION, POWDER, FOR SOLUTION INTRAVENOUS at 08:36

## 2024-02-13 RX ADMIN — SODIUM CHLORIDE, PRESERVATIVE FREE 10 ML: 5 INJECTION INTRAVENOUS at 08:39

## 2024-02-13 RX ADMIN — ENOXAPARIN SODIUM 40 MG: 100 INJECTION SUBCUTANEOUS at 08:36

## 2024-02-13 RX ADMIN — SODIUM CHLORIDE, POTASSIUM CHLORIDE, SODIUM LACTATE AND CALCIUM CHLORIDE: 600; 310; 30; 20 INJECTION, SOLUTION INTRAVENOUS at 05:35

## 2024-02-13 ASSESSMENT — PAIN DESCRIPTION - FREQUENCY: FREQUENCY: INTERMITTENT

## 2024-02-13 ASSESSMENT — PAIN DESCRIPTION - DESCRIPTORS: DESCRIPTORS: PRESSURE

## 2024-02-13 ASSESSMENT — PAIN SCALES - GENERAL
PAINLEVEL_OUTOF10: 4
PAINLEVEL_OUTOF10: 0

## 2024-02-13 ASSESSMENT — PAIN - FUNCTIONAL ASSESSMENT: PAIN_FUNCTIONAL_ASSESSMENT: ACTIVITIES ARE NOT PREVENTED

## 2024-02-13 ASSESSMENT — PAIN DESCRIPTION - LOCATION: LOCATION: HEAD

## 2024-02-13 NOTE — DISCHARGE SUMMARY
[facility]  Office: [phone]  Grady Ozuna DO, Victoriano Day DO, Julio C Brown DO, Hans Nesbitt DO, Earl Aguilar MD, Sylvia Trimble MD, Anai Rucker MD, Azeb Suarez MD,  Jaiden Landaverde MD, Sandra Morton MD, Nhan Faith MD,  Moreno Rendon DO, Kwan Nicole MD, Meek Bashir MD, Joshua Ozuna DO, Harper Bobo MD,  Jack Oneil DO, Juli Murry MD, Livia Guy MD, Kitty Briceño MD, Gladys Jordan MD,  Miguel Proctor MD, Burton Allen MD, Ana Prieto MD, Agusto Joyce MD, Vivek Patino MD, Mark Anthony Rodriguez MD, Daquan Valladares DO, Oli Hand DO, Mary Hammer MD,  Marco Hedrick MD, Shirley Waterhouse, CNP,  Ev Caldwell, CNP, Matthew Mosley, CNP,  Casi Sparks, DNP, Ely Sevilla, CNP, Rama Schmidt, CNP, Rosie Schofield CNP, Trudy Alexandre, CNP, Theresa Najera, CNP, Kavita Anderson PA-C, IMTIAZ Frey-C, Rosalba Mathur, CNP, Jovanna Yeh, CNP, Regla Rodriguez, CNS, Chiquis Mayfield, CNP, Maritza Montero, CNP, Tracy Schwab, CNP         [facility]   IN-PATIENT SERVICE   [facility]    Discharge Summary     Patient ID: Katja Isidro  :  1945   MRN: [id number]     ACCOUNT:  [id number]   Patient's PCP: Kassy Diez MD  Admit Date: 2024   Discharge Date: 2024  Length of Stay: 0  Code Status:  Full Code  Admitting Physician: Agusto Joyce MD  Discharge Physician: IMTIAZ Long     Active Discharge Diagnoses:     Hospital Problem Lists:  Principal Problem:    Acute pancreatitis, unspecified complication status, unspecified pancreatitis type  Active Problems:    Gastroenteritis    Epigastric pain    Leukocytosis  Resolved Problems:    * No resolved hospital problems. *      Admission Condition:  fair     Discharged Condition: good    Hospital Stay:     Hospital Course:  Katja Isidro is a 78 y.o. female who was admitted for the management of Acute pancreatitis, unspecified complication status,

## 2024-02-13 NOTE — PLAN OF CARE
Problem: Discharge Planning  Goal: Discharge to home or other facility with appropriate resources  Outcome: Progressing     Problem: Pain  Goal: Verbalizes/displays adequate comfort level or baseline comfort level  Outcome: Progressing     Problem: Gastrointestinal - Adult  Goal: Minimal or absence of nausea and vomiting  Outcome: Progressing     Problem: Gastrointestinal - Adult  Goal: Maintains or returns to baseline bowel function  Outcome: Progressing

## 2024-02-13 NOTE — PLAN OF CARE
Problem: Discharge Planning  Goal: Discharge to home or other facility with appropriate resources  2/13/2024 1426 by Kaye Foley RN  Outcome: Progressing  Flowsheets  Taken 2/13/2024 1200  Discharge to home or other facility with appropriate resources:   Identify barriers to discharge with patient and caregiver   Arrange for needed discharge resources and transportation as appropriate   Identify discharge learning needs (meds, wound care, etc)   Refer to discharge planning if patient needs post-hospital services based on physician order or complex needs related to functional status, cognitive ability or social support system  Taken 2/13/2024 0800  Discharge to home or other facility with appropriate resources:   Identify barriers to discharge with patient and caregiver   Arrange for needed discharge resources and transportation as appropriate   Identify discharge learning needs (meds, wound care, etc)   Refer to discharge planning if patient needs post-hospital services based on physician order or complex needs related to functional status, cognitive ability or social support system     Problem: Pain  Goal: Verbalizes/displays adequate comfort level or baseline comfort level  2/13/2024 1426 by Kaye Foley RN  Outcome: Progressing     Problem: Gastrointestinal - Adult  Goal: Minimal or absence of nausea and vomiting  2/13/2024 1426 by Kaye Foley RN  Outcome: Progressing  Flowsheets  Taken 2/13/2024 1200  Minimal or absence of nausea and vomiting: Administer IV fluids as ordered to ensure adequate hydration  Taken 2/13/2024 0800  Minimal or absence of nausea and vomiting: Administer IV fluids as ordered to ensure adequate hydration     Problem: Gastrointestinal - Adult  Goal: Maintains or returns to baseline bowel function  2/13/2024 1426 by Kaye Foley RN  Outcome: Progressing  Flowsheets  Taken 2/13/2024 1200  Maintains or returns to baseline bowel function:   Assess bowel function   Encourage  oral fluids to ensure adequate hydration  Taken 2/13/2024 0800  Maintains or returns to baseline bowel function:   Assess bowel function   Encourage oral fluids to ensure adequate hydration

## 2024-02-13 NOTE — CARE COORDINATION
Case Management Assessment  Initial Evaluation    Date/Time of Evaluation: 2/13/2024 12:52 PM  Assessment Completed by: AYSHA Cervantes, MARILEE    If patient is discharged prior to next notation, then this note serves as note for discharge by case management.    Patient Name: Katja sIidro                   YOB: 1945  Diagnosis: Epigastric pain [R10.13]  Nausea vomiting and diarrhea [R11.2, R19.7]  Acute pancreatitis, unspecified complication status, unspecified pancreatitis type [K85.90]                   Date / Time: 2/12/2024  6:34 AM    Patient Admission Status: Observation   Readmission Risk (Low < 19, Mod (19-27), High > 27): No data recorded  Current PCP: Moise Murphy Jr., MD  PCP verified by ? Yes    Chart Reviewed: Yes      History Provided by: Patient  Patient Orientation: Alert and Oriented    Patient Cognition: Alert    Hospitalization in the last 30 days (Readmission):  No    If yes, Readmission Assessment in  Navigator will be completed.    Advance Directives:      Code Status: Full Code   Patient's Primary Decision Maker is: Legal Next of Kin      Discharge Planning:    Patient lives with: Spouse/Significant Other Type of Home: House  Primary Care Giver: Self  Patient Support Systems include: Spouse/Significant Other   Current Financial resources: Medicare  Current community resources: None  Current services prior to admission: None            Current DME:              Type of Home Care services:  None    ADLS  Prior functional level: Independent in ADLs/IADLs  Current functional level: Independent in ADLs/IADLs    PT AM-PAC:   /24  OT AM-PAC:   /24    Family can provide assistance at DC: No  Would you like Case Management to discuss the discharge plan with any other family members/significant others, and if so, who? No  Plans to Return to Present Housing: Yes  Other Identified Issues/Barriers to RETURNING to current housing: None  Potential Assistance needed at discharge:  N/A            Potential DME:    Patient expects to discharge to: House  Plan for transportation at discharge: Family    Financial    Payor: [facility] MEDICARE / Plan: [facility] MEDICARE COMPLETE / Product Type: *No Product type* /     Does insurance require precert for SNF: Yes    Potential assistance Purchasing Medications: No  Meds-to-Beds request:        [facility] [id number] - [place], [place] - [address] [phone] - F [phone] [address] [place] [place] [address]  Phone: [phone] Fax: [phone]      Notes:    Factors facilitating achievement of predicted outcomes: Family support, Cooperative, and Pleasant    Barriers to discharge: Decreased endurance    Additional Case Management Notes: Patient is independent at home. No anticipated discharge needs    The Plan for Transition of Care is related to the following treatment goals of Epigastric pain [R10.13]  Nausea vomiting and diarrhea [R11.2, R19.7]  Acute pancreatitis, unspecified complication status, unspecified pancreatitis type [K85.90]    IF APPLICABLE: The Patient and/or patient representative Katja and her family were provided with a choice of provider and agrees with the discharge plan. Freedom of choice list with basic dialogue that supports the patient's individualized plan of care/goals and shares the quality data associated with the providers was provided to:     Patient Representative Name:       The Patient and/or Patient Representative Agree with the Discharge Plan?      AYSHA Cervantes, LSW  Case Management Department  Ph: [phone] Fax:

## 2024-02-13 NOTE — PROGRESS NOTES
[facility]  Office: [phone]  Grady Ozuna DO, Victoriano Day DO, Julio C Brown DO, Hans Nesbitt DO, Earl Aguilar MD, Sylvia Trimble MD, Anai Rucker MD, Azeb Suarez MD,  Jaiden Landaverde MD, Sandra Morton MD, Nhan Faith MD,  Moreno Rendon DO, Kwan Nicole MD, Meek Bashir MD, Joshua Ozuna DO, Harper Bobo MD,  Jack Oneil DO, Juli Murry MD, Livia Guy MD, Kitty Briceño MD, Gladys Jordan MD,  Miguel Proctor MD, Burton Allen MD, Ana Prieto MD, Agusto Joyce MD, Vivek Patino MD, Mark Anthony Rodriguez MD, Daquan Valladares DO, Oli Hand DO, Mary Hammer MD,  Marco Hedrick MD, Shirley Waterhouse, CNP,  Ev Caldwell CNP, Matthew Mosley, CNP,  Casi Sparks, CALISTA, Ely Sevilla, CNP, Rama Schmidt, CNP, Rosie Schofield CNP, Trudy Alexandre CNP, Theresa Najera, CNP, Kavita Anderson, PA-C, Adriana Mazariegos PA-C, Rosalba Mathur, CNP, Jovanna Yeh, CNP, Regla Rodriguez, CNS, Chiquis Mayfield, CNP, Maritza Montero CNP, Tracy Schwab, CNP         [facility]   IN-PATIENT SERVICE   [facility]    Progress Note    2/13/2024    11:56 AM    Name:   Katja Isidro  MRN:     [id number]     Acct:      [id number]   Room:   332/332-01   Day:  0  Admit Date:  2/12/2024  6:34 AM    PCP:   Kassy Diez MD  Code Status:  Full Code    Subjective:     Patient is a very pleasant 78-year-old female who is seen in follow-up for acute pancreatitis.  She denies any fevers, chills, nausea, vomiting, new or worsening abdominal pain overnight.  Patient states she feels much better after receiving IV fluids.  She does not currently have any signs or symptoms of infection.  Patient is likely stable for discharge home later today.  No additional questions or concerns for the provider at this time.    Medications:     Allergies:    Allergies   Allergen Reactions    Azithromycin Hives    Sulfa Antibiotics Other (See Comments)     Boils       Current Meds:   Scheduled  Meds:    sodium chloride flush  5-40 mL IntraVENous 2 times per day    enoxaparin  40 mg SubCUTAneous Daily    pantoprazole (PROTONIX) 40 mg in sodium chloride (PF) 0.9 % 10 mL injection  40 mg IntraVENous Daily    metoprolol tartrate  50 mg Oral BID     Continuous Infusions:    sodium chloride      lactated ringers IV soln 75 mL/hr at 24 0839     PRN Meds: sodium chloride flush, sodium chloride, potassium chloride **OR** potassium alternative oral replacement **OR** potassium chloride, magnesium sulfate, ondansetron **OR** ondansetron, polyethylene glycol, acetaminophen **OR** acetaminophen    Data:     Vitals:  BP (!) 160/60   Pulse 85   Temp 98.1 °F (36.7 °C) (Oral)   Resp 16   Ht 1.575 m (5' 2\")   Wt 79.4 kg (175 lb)   SpO2 95%   BMI 32.01 kg/m²   Temp (24hrs), Av.5 °F (36.9 °C), Min:98.1 °F (36.7 °C), Max:99 °F (37.2 °C)    No results for input(s): \"POCGLU\" in the last 72 hours.    I/O (24Hr):  No intake or output data in the 24 hours ending 24 1156    Labs:  Hematology:  Recent Labs     24  0643 24  0929   WBC 17.4* 6.2   RBC 5.13 4.12   HGB 16.4* 13.3   HCT 48.1* 38.8   MCV 93.8 94.3   MCH 31.9 32.2   MCHC 34.0 34.2   RDW 13.7 13.5    177   MPV 8.4 8.4     Chemistry:  Recent Labs     24  0643 24  0929    141   K 4.3 4.5    107   CO2 26 27   GLUCOSE 175* 100*   BUN 18 14   CREATININE 0.9 0.8   ANIONGAP 12 7*   LABGLOM >60 >60   CALCIUM 9.3 8.4*   TROPHS 8  --      Recent Labs     24  0643   PROT 7.2   LABALBU 4.4   AST 24   ALT 28   ALKPHOS 72   BILITOT 0.6   LIPASE 207*     ABG:No results found for: \"POCPH\", \"PHART\", \"PH\", \"POCPCO2\", \"URW3LBD\", \"PCO2\", \"POCPO2\", \"PO2ART\", \"PO2\", \"POCHCO3\", \"QWR7CWK\", \"HCO3\", \"NBEA\", \"PBEA\", \"BEART\", \"BE\", \"THGBART\", \"THB\", \"OAO6SZI\", \"UNUG7QQO\", \"K0EOTKMR\", \"O2SAT\", \"FIO2\"  No results found for: \"SPECIAL\"  No results found for: \"CULTURE\"    Radiology:  CT ABDOMEN PELVIS WO CONTRAST Additional Contrast?

## 2024-10-09 ENCOUNTER — HOSPITAL ENCOUNTER (OUTPATIENT)
Dept: PHYSICAL THERAPY | Facility: CLINIC | Age: 79
Setting detail: THERAPIES SERIES
Discharge: HOME | End: 2024-10-09
Payer: MEDICARE

## 2024-10-09 PROCEDURE — 97110 THERAPEUTIC EXERCISES: CPT

## 2024-10-09 PROCEDURE — 97161 PT EVAL LOW COMPLEX 20 MIN: CPT

## 2024-10-09 NOTE — CONSULTS
[] [facility]  Outpatient Rehabilitation &  Therapy  [address]  [phone]  [phone] [] [facility]  Outpatient Rehabilitation &  Therapy  [address]  P: [phone]  F: [phone] [x] [facility]  Outpatient Rehabilitation &  Therapy  [address]  P: [phone]  F: [phone] [] [facility]  Outpatient Rehabilitation &  Therapy  [address]  [phone]  [phone] [] [facility]  Outpatient Rehabilitation &  Therapy  [address]   P: [phone]  F: [phone]  [] [facility]  Outpatient Rehabilitation &  Therapy  [address]  P: [phone]  F: [phone] [] [facility]  Outpatient Rehabilitation &  Therapy  [address]  P: [phone]  F: [phone] [] [facility]  Outpatient Rehabilitation &  Therapy  [address]  P: [phone]  F: [phone] [] [facility]  Outpatient Rehabilitation &  Therapy  [address]  P: [phone]  F: [phone]  [] [facility] Outpatient Rehabilitation &  Therapy  [address]  P: [phone]  F: [phone]     Physical Therapy Lower Extremity Evaluation    Date:  10/9/2024  Patient: Katja Isidro  : 1945  MRN: [id number]  Physician: Moise Brambila MD  Insurance: [facility] Medicare - Visits BMN - No Hard Max - Auth After Eval  Medical Diagnosis: M16.12 - Primary OA of L hip  Rehab Codes: M25.55, M25.65, R26.2  Onset date: 10/1/24  Next 's appt.: prn    Subjective:   CC/HPI: Patient reports with a ten year history of chronic L hip pain. Patient states that pain is worse with walking > 30 minutes. Patient was given a steroid taper and this helped immensely. Patient stated that when she walks, she \"feels like her hip is going out of the

## 2024-10-15 ENCOUNTER — HOSPITAL ENCOUNTER (OUTPATIENT)
Dept: PHYSICAL THERAPY | Facility: CLINIC | Age: 79
Setting detail: THERAPIES SERIES
Discharge: HOME | End: 2024-10-15
Payer: MEDICARE

## 2024-10-15 PROCEDURE — 97140 MANUAL THERAPY 1/> REGIONS: CPT

## 2024-10-15 PROCEDURE — 97110 THERAPEUTIC EXERCISES: CPT

## 2024-10-15 NOTE — FLOWSHEET NOTE
[] [facility]  Outpatient Rehabilitation &  Therapy  [address]  [phone]  [phone] [] [facility]  Outpatient Rehabilitation &  Therapy  [address]  P: [phone]  F: [phone] [x] [facility]  Outpatient Rehabilitation &  Therapy  [address]  P: [phone]  F: [phone] [] [facility]  Outpatient Rehabilitation &  Therapy  [address]  [phone]  [phone] [] [facility]  Outpatient Rehabilitation &  Therapy  [address]   P: [phone]  F: [phone]  [] [facility]  Outpatient Rehabilitation &  Therapy  [address]  P: [phone]  F: [phone] [] [facility]  Outpatient Rehabilitation &  Therapy  [address]  P: [phone]  F: [phone] [] [facility]  Outpatient Rehabilitation &  Therapy  [address]  P: [phone]  F: [phone] [] [facility]  Outpatient Rehabilitation &  Therapy  [address]  P: [phone]  F: [phone]  [] [facility] Outpatient Rehabilitation &  Therapy  [address]  P: [phone]  F: [phone]     Physical Therapy Daily Treatment Note    Date:  10/15/2024  Patient Name:  Katja Isidro    :  1945  MRN: [id number]  Physician: Moise Brambila MD                   Insurance: [facility] Medicare - Visits BMN - No Hard Max - Auth After Eval..per Lynn auth was submitted and pt is okay to be seen -   Medical Diagnosis: M16.12 - Primary OA of L hip               Rehab Codes: M25.55, M25.65, R26.2  Onset date: 10/1/24               Next 's appt.: prn  Visit# / total visits:     Cancels/No Shows: 0    Subjective:    Pain:  [x] Yes  [] No Location: L hip  Pain Rating: (0-10 scale) 0.5/10  Pain altered Tx:  [x] No  [] Yes

## 2024-10-17 ENCOUNTER — HOSPITAL ENCOUNTER (OUTPATIENT)
Dept: PHYSICAL THERAPY | Facility: CLINIC | Age: 79
Setting detail: THERAPIES SERIES
Discharge: HOME | End: 2024-10-17
Payer: MEDICARE

## 2024-10-17 PROCEDURE — 97110 THERAPEUTIC EXERCISES: CPT

## 2024-10-17 PROCEDURE — 97140 MANUAL THERAPY 1/> REGIONS: CPT

## 2024-10-22 ENCOUNTER — HOSPITAL ENCOUNTER (OUTPATIENT)
Dept: PHYSICAL THERAPY | Facility: CLINIC | Age: 79
Setting detail: THERAPIES SERIES
Discharge: HOME | End: 2024-10-22
Payer: MEDICARE

## 2024-10-22 PROCEDURE — 97140 MANUAL THERAPY 1/> REGIONS: CPT

## 2024-10-22 PROCEDURE — 97110 THERAPEUTIC EXERCISES: CPT

## 2024-10-22 NOTE — FLOWSHEET NOTE
Yes  Action:  Comments:  She states she is doing better than she thought she would be after doing a lot of yard work over the weekend. Mentions minimal pain with ambulation at arrival.     Objective:  Modalities:   Precautions [x] No  [] Yes:  Exercises:  Exercise Reps/ Time Weight/ Level Comments   Nustep 6' L4          Mat      Seated HS stretch 3x30\"     Piriformis S 3x30\"       Figure 4 S 3x30\"       Supine bridge 2x10x5\"   W/ glut iso   TA sets 10x5\"     TA march x20     TA SLR 2x10     Clamshells 2x10     SL hip ABD 2x10             Gym      3 way hip X10 B orange    Tband sidestepping 2L orange    Tandem stance 2x30\" B  One set eyes open, one set eyes closed         Manual: DI/STR L posterolateral hip     Specific Instructions for next treatment: continue with above, general LE stretching and strengthening, core strengthening, balance/proprioception, manual prn. UPDATE HEP      Treatment Charges: Mins Units   []  Modalities     [x]  Ther Exercise 34 2   [x]  Manual Therapy 15 1   []  Ther Activities     []  Neuro Re-ed     []  Vasocompression     [] Gait     []  Other     Total Billable time 49 3       Assessment: [x] Progressing toward goals. TTP mostly glut med area with goof relief following manual. Reviewed stretches and mat program with good recall, min cues for technique. She cont to have soreness in her L hip in sidelying but able to tolerate. Advanced strengthening with addition of tband sidestepping and added resistance to 3 way hip. Pt with no increase in pain post treatment.     [] No change.     [] Other:  [x] Patient would continue to benefit from skilled physical therapy services in order to: meet goals listed below    STG: (to be met in 10 treatments)  ? Pain: Patient to report reduction in max pain from 7/10 to no greater than 4/10 to improve activity tolerance  ? ROM: Patient to increase bilat HS length by at least 5 deg to allow for greater ability to squat and complete transfers  ? ROM:

## 2024-10-24 ENCOUNTER — HOSPITAL ENCOUNTER (OUTPATIENT)
Dept: PHYSICAL THERAPY | Facility: CLINIC | Age: 79
Setting detail: THERAPIES SERIES
Discharge: HOME | End: 2024-10-24
Payer: MEDICARE

## 2024-10-24 PROCEDURE — 97140 MANUAL THERAPY 1/> REGIONS: CPT

## 2024-10-24 PROCEDURE — 97110 THERAPEUTIC EXERCISES: CPT

## 2024-10-24 NOTE — FLOWSHEET NOTE
[] [facility]  Outpatient Rehabilitation &  Therapy  [address]  [phone]  [phone] [] [facility]  Outpatient Rehabilitation &  Therapy  [address]  P: [phone]  F: [phone] [x] [facility]  Outpatient Rehabilitation &  Therapy  [address]  P: [phone]  F: [phone] [] [facility]  Outpatient Rehabilitation &  Therapy  [address]  [phone]  [phone] [] [facility]  Outpatient Rehabilitation &  Therapy  [address]   P: [phone]  F: [phone]  [] [facility]  Outpatient Rehabilitation &  Therapy  [address]  P: [phone]  F: [phone] [] [facility]  Outpatient Rehabilitation &  Therapy  [address]  P: [phone]  F: [phone] [] [facility]  Outpatient Rehabilitation &  Therapy  [address]  P: [phone]  F: [phone] [] [facility]  Outpatient Rehabilitation &  Therapy  [address]  P: [phone]  F: [phone]  [] [facility] Outpatient Rehabilitation &  Therapy  [address]  P: [phone]  F: [phone]     Physical Therapy Daily Treatment Note    Date:  10/24/2024  Patient Name:  Katja Isidro    :  1945  MRN: [id number]  Physician: Moise Brambila MD                   Insurance: [facility] Medicare - Visits BMN - No Hard Max - Auth After Eval, Received auth approval for 20vs of PT from 10/9-24 auth#[id number]   Medical Diagnosis: M16.12 - Primary OA of L hip               Rehab Codes: M25.55, M25.65, R26.2  Onset date: 10/1/24               Next 's appt.: prn  Visit# / total visits:     Cancels/No Shows: 0    Subjective:    Pain:  [x] Yes  [] No Location: L hip  Pain Rating: (0-10 scale) 2/10  Pain altered Tx:  [x] No  []

## 2024-10-29 ENCOUNTER — HOSPITAL ENCOUNTER (OUTPATIENT)
Dept: PHYSICAL THERAPY | Facility: CLINIC | Age: 79
Setting detail: THERAPIES SERIES
Discharge: HOME | End: 2024-10-29
Payer: MEDICARE

## 2024-10-29 NOTE — FLOWSHEET NOTE
[] [facility]  Outpatient Rehabilitation &  Therapy  [address]  [phone]  [phone] [] [facility]  Outpatient Rehabilitation &  Therapy  [address]  P: [phone]  F: [phone] [x] [facility]  Outpatient Rehabilitation &  Therapy  [address]  P: [phone]  F: [phone] [] [facility]  Outpatient Rehabilitation &  Therapy  [address]  [phone]  [phone] [] [facility]  Outpatient Rehabilitation &  Therapy  [address]   P: [phone]  F: [phone]  [] [facility]  Outpatient Rehabilitation &  Therapy  [address]  P: [phone]  F: [phone] [] [facility]  Outpatient Rehabilitation &  Therapy  [address]  P: [phone]  F: [phone] [] [facility]  Outpatient Rehabilitation &  Therapy  [address]  P: [phone]  F: [phone] [] [facility]  Outpatient Rehabilitation &  Therapy  [address]  P: [phone]  F: [phone]  [] [facility] Outpatient Rehabilitation &  Therapy  [address]  P: [phone]  F: [phone]     Therapy Cancel/No Show note    Date: 10/29/2024  Patient: Katja DIAZ Cruzsharronmonjasmin  : 1945  MRN: [id number]    Cancels/No Shows to date: 0    For today's appointment patient:    [x]  Cancelled    [] Rescheduled appointment    [] No-show     Reason given by patient:    [x]  Patient ill    []  Conflicting appointment    [] No transportation      [] Conflict with work    [] No reason given    [] Weather related    [] COVID-19    [] Other:      Comments:        [] Next appointment was confirmed    Electronically signed by: Jenny Miller, PTA

## 2024-10-31 ENCOUNTER — HOSPITAL ENCOUNTER (OUTPATIENT)
Dept: PHYSICAL THERAPY | Facility: CLINIC | Age: 79
Setting detail: THERAPIES SERIES
Discharge: HOME | End: 2024-10-31
Payer: MEDICARE

## 2024-10-31 PROCEDURE — 97140 MANUAL THERAPY 1/> REGIONS: CPT

## 2024-10-31 PROCEDURE — 97110 THERAPEUTIC EXERCISES: CPT

## 2024-10-31 NOTE — FLOWSHEET NOTE
[] [facility]  Outpatient Rehabilitation &  Therapy  [address]  [phone]  [phone] [] [facility]  Outpatient Rehabilitation &  Therapy  [address]  P: [phone]  F: [phone] [x] [facility]  Outpatient Rehabilitation &  Therapy  [address]  P: [phone]  F: [phone] [] [facility]  Outpatient Rehabilitation &  Therapy  [address]  [phone]  [phone] [] [facility]  Outpatient Rehabilitation &  Therapy  [address]   P: [phone]  F: [phone]  [] [facility]  Outpatient Rehabilitation &  Therapy  [address]  P: [phone]  F: [phone] [] [facility]  Outpatient Rehabilitation &  Therapy  [address]  P: [phone]  F: [phone] [] [facility]  Outpatient Rehabilitation &  Therapy  [address]  P: [phone]  F: [phone] [] [facility]  Outpatient Rehabilitation &  Therapy  [address]  P: [phone]  F: [phone]  [] [facility] Outpatient Rehabilitation &  Therapy  [address]  P: [phone]  F: [phone]     Physical Therapy Daily Treatment Note    Date:  10/31/2024  Patient Name:  Katja Isidro    :  1945  MRN: [id number]  Physician: Moise Brambila MD                   Insurance: [facility] Medicare - Visits BMN - No Hard Max - Auth After Eval, Received auth approval for 20vs of PT from 10/9-24 auth#[id number]   Medical Diagnosis: M16.12 - Primary OA of L hip               Rehab Codes: M25.55, M25.65, R26.2  Onset date: 10/1/24               Next 's appt.: prn  Visit# / total visits:     Cancels/No Shows: 0    Subjective:    Pain:  [x] Yes  [] No Location: L hip  Pain Rating: (0-10 scale) 2/10  Pain altered Tx:  [x] No  []

## 2024-11-05 ENCOUNTER — HOSPITAL ENCOUNTER (OUTPATIENT)
Dept: PHYSICAL THERAPY | Facility: CLINIC | Age: 79
Setting detail: THERAPIES SERIES
Discharge: HOME | End: 2024-11-05
Payer: MEDICARE

## 2024-11-05 PROCEDURE — 97110 THERAPEUTIC EXERCISES: CPT

## 2024-11-05 PROCEDURE — 97140 MANUAL THERAPY 1/> REGIONS: CPT

## 2024-11-05 NOTE — FLOWSHEET NOTE
[] [facility]  Outpatient Rehabilitation &  Therapy  [address]  [phone]  [phone] [] [facility]  Outpatient Rehabilitation &  Therapy  [address]  P: [phone]  F: [phone] [x] [facility]  Outpatient Rehabilitation &  Therapy  [address]  P: [phone]  F: [phone] [] [facility]  Outpatient Rehabilitation &  Therapy  [address]  [phone]  [phone] [] [facility]  Outpatient Rehabilitation &  Therapy  [address]   P: [phone]  F: [phone]  [] [facility]  Outpatient Rehabilitation &  Therapy  [address]  P: [phone]  F: [phone] [] [facility]  Outpatient Rehabilitation &  Therapy  [address]  P: [phone]  F: [phone] [] [facility]  Outpatient Rehabilitation &  Therapy  [address]  P: [phone]  F: [phone] [] [facility]  Outpatient Rehabilitation &  Therapy  [address]  P: [phone]  F: [phone]  [] [facility] Outpatient Rehabilitation &  Therapy  [address]  P: [phone]  F: [phone]     Physical Therapy Daily Treatment Note    Date:  2024  Patient Name:  Katja Isidro    :  1945  MRN: [id number]  Physician: Moise Brambila MD                   Insurance: [facility] Medicare - Visits BMN - No Hard Max - Auth After Eval, Received auth approval for 20vs of PT from 10/9-24 auth#[id number]   Medical Diagnosis: M16.12 - Primary OA of L hip               Rehab Codes: M25.55, M25.65, R26.2  Onset date: 10/1/24               Next 's appt.: prn  Visit# / total visits:     Cancels/No Shows: 0    Subjective:    Pain:  [x] Yes  [] No Location: L hip  Pain Rating: (0-10 scale) 2/10  Pain altered Tx:  [x] No  []

## 2024-11-07 ENCOUNTER — HOSPITAL ENCOUNTER (OUTPATIENT)
Dept: PHYSICAL THERAPY | Facility: CLINIC | Age: 79
Setting detail: THERAPIES SERIES
Discharge: HOME | End: 2024-11-07
Payer: MEDICARE

## 2024-11-07 PROCEDURE — 97140 MANUAL THERAPY 1/> REGIONS: CPT

## 2024-11-07 PROCEDURE — 97110 THERAPEUTIC EXERCISES: CPT

## 2024-11-07 NOTE — FLOWSHEET NOTE
greater ability to squat and complete transfers  ? ROM: Patient to improve L hip ER ROM by at least 5 deg to demo reduced tightness of the piriformis mm and aid in neutral gait pattern  ? Function: Patient to decrease 5xSTS time to no greater than 15.5 seconds without valgus collapse to demo improved pelvic stability   Patient to be independent with home exercise program as demonstrated by performance with correct form without cues.  LTG: (to be met in 20 treatments)  Patient to report decrease in max pain from 7/10 to no greater than a 2/10 to improve ability to walk for prolonged periods  Patient to improve score on LEFI to reflect < 25% impairment related to her L hip  Patient to restore bilat glut max/med strength to at least 4/5 to improve ability to climb stairs and perform ADLs   Patient to increase bilat SLS time with EO to at least 15 seconds to display improved proprioception and reduce unsteadiness with walking on grassy terrain              Patient goals: \"walking longer distance without pain\"     Pt. Education:  [x] Yes  [] No  [x] Reviewed Prior HEP/Ed  Method of Education: [] Verbal  [] Demo  [x] Written  Access Code: [email or web address]  URL: [email or web address]  Date: 10/24/2024  Prepared by: Jenny Taylor    Exercises  - Supine Piriformis Stretch with Foot on Ground  - 2 x daily - 7 x weekly - 3 sets - 30\" hold  - Supine Figure 4 Piriformis Stretch  - 2 x daily - 7 x weekly - 3 sets - 30\" hold  - Seated Hamstring Stretch  - 2 x daily - 7 x weekly - 3 sets - 30\" hold  - Supine Bridge  - 1 x daily - 7 x weekly - 2 sets - 10 reps - 5\" hold  - Sidelying Hip Abduction  - 1 x daily - 7 x weekly - 2 sets - 10 reps  - Clamshell with Resistance  - 1 x daily - 7 x weekly - 2 sets - 10 reps  - Supine March  - 1 x daily - 7 x weekly - 2 sets - 10 reps  - Small Range Straight Leg Raise  - 1 x daily - 7 x weekly - 2 sets - 10 reps  - Standing Hip Flexion with Counter Support  - 1 x daily - 7 x weekly - 2 sets -

## 2024-11-12 ENCOUNTER — HOSPITAL ENCOUNTER (OUTPATIENT)
Dept: PHYSICAL THERAPY | Facility: CLINIC | Age: 79
Setting detail: THERAPIES SERIES
Discharge: HOME | End: 2024-11-12
Payer: MEDICARE

## 2024-11-12 PROCEDURE — 97110 THERAPEUTIC EXERCISES: CPT

## 2024-11-12 NOTE — PROGRESS NOTES
[] [facility]  Outpatient Rehabilitation &  Therapy  [address]  [phone]  [phone] [] [facility]  Outpatient Rehabilitation &  Therapy  [address]  P: [phone]  F: [phone] [x] [facility]  Outpatient Rehabilitation &  Therapy  [address]  P: [phone]  F: [phone] [] [facility]  Outpatient Rehabilitation &  Therapy  [address]  [phone]  [phone] [] [facility]  Outpatient Rehabilitation &  Therapy  [address]   P: [phone]  F: [phone]  [] [facility]  Outpatient Rehabilitation &  Therapy  [address]  P: [phone]  F: [phone] [] [facility]  Outpatient Rehabilitation &  Therapy  [address]  P: [phone]  F: [phone] [] [facility]  Outpatient Rehabilitation &  Therapy  [address]  P: [phone]  F: [phone] [] [facility]  Outpatient Rehabilitation &  Therapy  [address]  P: [phone]  F: [phone]  [] [facility] Outpatient Rehabilitation &  Therapy  [address]  P: [phone]  F: [phone]     Physical Therapy Progress Note    Date: 2024      Patient: Katja Isidro  : 1945  MRN: [id number]    Physician: Moise Brambila MD                   Insurance: [facility] Medicare - Visits BMN - No Hard Max - Auth After Eval, Received auth approval for 20vs of PT from 10/9-24 auth#[id number]   Medical Diagnosis: M16.12 - Primary OA of L hip               Rehab Codes: M25.55, M25.65, R26.2  Onset date: 10/1/24               Next Dr's appt.: prn  Visit# / total visits:                       Cancels/No Shows: 0/0  Date range of services: 10/9/24 to 24      Subjective:  Pain:  [x] Yes  [] No  Location: L hip  Pain

## 2024-11-14 ENCOUNTER — HOSPITAL ENCOUNTER (OUTPATIENT)
Dept: PHYSICAL THERAPY | Facility: CLINIC | Age: 79
Setting detail: THERAPIES SERIES
Discharge: HOME | End: 2024-11-14
Payer: MEDICARE

## 2024-11-14 PROCEDURE — 97110 THERAPEUTIC EXERCISES: CPT

## 2024-11-14 NOTE — FLOWSHEET NOTE
compliance with HEP.     Pain:  [x] Yes  [] No Location: L hip  Pain Rating: (0-10 scale) \"sore\"/10  Pain altered Tx:  [x] No  [] Yes  Action:  Comments:     Objective:  Modalities:   Precautions [x] No  [] Yes:  Exercises:  Exercise Reps/ Time Weight/ Level completed Comments   Nustep 7' L5 x           Mat       Seated HS stretch 3x30\"  x    Piriformis S 3x30\"   x     Figure 4 S 3x30\"   x     Supine hip IR/ER x15      LTR 10x5\"  x    Hip flexor stretch 90\"   EOB   Supine bridge 2x10x5\" lime x W/ glut iso   TA march x20  x    TA SLR 2x10  x    Clamshells 2x10 lime x    SL hip ABD 2x10 lime x            Gym       3 way hip 2X10 B lime x    Tband sidestepping 2L lime x    SLS 2x15\"  x    TG squats 2x10 L16 x    Step ups - fwd 15 B 6\" x    Step ups lateral  15 B  6\" x    Manual: DI L posterolateral hip  NT      Specific Instructions for next treatment: Cont at 2x/week next week then reduce frequency to 1x/week to prepare pt for independent HEP      Treatment Charges: Mins Units   []  Modalities     [x]  Ther Exercise 42 3   []  Manual Therapy     []  Ther Activities     []  Neuro Re-ed     []  Vasocompression     [] Gait     []  Other     Total Billable time 42 3       Assessment: [x] Progressing toward goals. Session started on nustep for gentle warm up.  Patient then performed exercises on mat with focus on increasing flexibilty along with core strengthening. Patient tolerated exercises without notes on increasing pain. Added lateral side step ups today with good tolerance from patient. General muscle fatigue noted after session today.     [] No change.     [] Other:  [x] Patient would continue to benefit from skilled physical therapy services in order to: meet goals listed below    STG: (to be met in 10 treatments)  ? Pain: Patient to report reduction in max pain from 7/10 to no greater than 4/10 to improve activity tolerance  ? ROM: Patient to increase bilat HS length by at least 5 deg to allow for greater ability

## 2024-11-19 ENCOUNTER — HOSPITAL ENCOUNTER (OUTPATIENT)
Dept: PHYSICAL THERAPY | Facility: CLINIC | Age: 79
Setting detail: THERAPIES SERIES
Discharge: HOME | End: 2024-11-19
Payer: MEDICARE

## 2024-11-19 PROCEDURE — 97110 THERAPEUTIC EXERCISES: CPT

## 2024-11-19 NOTE — FLOWSHEET NOTE
[x] No Location: L hip  Pain Rating: (0-10 scale) 0/10  Pain altered Tx:  [x] No  [] Yes  Action:  Comments:     Objective:  Modalities:   Precautions [x] No  [] Yes:  Exercises:  Exercise Reps/ Time Weight/ Level completed Comments   Nustep 7' L5 x           Mat       Piriformis S 3x30\"   x     Figure 4 S 3x30\"   x     Supine hip IR/ER x15  x    LTR 10x5\"  x    Hip flexor stretch 90\"  x EOB   Supine bridge 2x10x5\" lime x W/ glut iso   TA march x20  x    TA SLR 2x10  x    Clamshells 2x10 lime x    SL hip ABD 2x10 lime x            Gym       HS stool stretch 3x30\"  x    3 way hip 2X10 B lime x    Tband sidestepping 2L lime x    SLS 2x15\"  x    TG squats 2x10 L16 x    Step ups - fwd 15 B 6\" x    Step ups lateral  15 B  6\" x    Manual: DI L posterolateral hip  NT      Specific Instructions for next treatment: Cont above, 1x/week      Treatment Charges: Mins Units   []  Modalities     [x]  Ther Exercise 45 3   []  Manual Therapy     []  Ther Activities     []  Neuro Re-ed     []  Vasocompression     [] Gait     []  Other     Total Billable time 45 3       Assessment: [x] Progressing toward goals. Cont with Nustep for a warm up to promote blood flow prior to ex. Progressed HS stretch to standing with good tolerance. Held any strength progressions and focused on completing previous program without further exacerbation of sx. Able to complete most reps of step ups without UE support. SLS is improving.     [] No change.     [] Other:  [x] Patient would continue to benefit from skilled physical therapy services in order to: meet goals listed below  STG: (to be met in 10 treatments)  ? Pain: Patient to report reduction in max pain from 7/10 to no greater than 4/10 to improve activity tolerance - MET  ? ROM: Patient to increase bilat HS length by at least 5 deg to allow for greater ability to squat and complete transfers - ongoing  ? ROM: Patient to improve L hip ER ROM by at least 5 deg to demo reduced tightness of the

## 2024-11-25 ENCOUNTER — HOSPITAL ENCOUNTER (OUTPATIENT)
Dept: PHYSICAL THERAPY | Facility: CLINIC | Age: 79
Setting detail: THERAPIES SERIES
Discharge: HOME | End: 2024-11-25
Payer: MEDICARE

## 2024-11-25 PROCEDURE — 97110 THERAPEUTIC EXERCISES: CPT

## 2024-11-25 NOTE — FLOWSHEET NOTE
quad strengthening. Addition of squat taps to chair for eccentric quad control with min VC to encourage greater hip hinge vs quad dominance with squat. Addition of tandem stance with no LOB noted and good ankle strategy. Patient has made great progress toward all short and long term goals at this time and she is pleased with level of function she has returned to. Plan to update HEP at next session and prepare for discharge.     [] No change.     [] Other:  [x] Patient would continue to benefit from skilled physical therapy services in order to: meet goals listed below  STG: (to be met in 10 treatments)  ? Pain: Patient to report reduction in max pain from 7/10 to no greater than 4/10 to improve activity tolerance - MET  ? ROM: Patient to increase bilat HS length by at least 5 deg to allow for greater ability to squat and complete transfers - ongoing  ? ROM: Patient to improve L hip ER ROM by at least 5 deg to demo reduced tightness of the piriformis mm and aid in neutral gait pattern - MET  ? Function: Patient to decrease 5xSTS time to no greater than 15.5 seconds without valgus collapse to demo improved pelvic stability - MET  Patient to be independent with home exercise program as demonstrated by performance with correct form without cues. - MET  LTG: (to be met in 20 treatments)  Patient to report decrease in max pain from 7/10 to no greater than a 2/10 to improve ability to walk for prolonged periods - MET  Patient to improve score on LEFI to reflect < 25% impairment related to her L hip - MET  Patient to restore bilat glut max/med strength to at least 4/5 to improve ability to climb stairs and perform ADLs - ongoing  Patient to increase bilat SLS time with EO to at least 15 seconds to display improved proprioception and reduce unsteadiness with walking on grassy terrain - MET               Patient goals: \"walking longer distance without pain\"        Pt. Education:  [x] Yes  [] No  [x] Reviewed Prior

## 2024-12-04 ENCOUNTER — HOSPITAL ENCOUNTER (OUTPATIENT)
Dept: PHYSICAL THERAPY | Facility: CLINIC | Age: 79
Setting detail: THERAPIES SERIES
Discharge: HOME | End: 2024-12-04
Payer: MEDICARE

## 2024-12-04 PROCEDURE — 97140 MANUAL THERAPY 1/> REGIONS: CPT

## 2024-12-04 PROCEDURE — 97110 THERAPEUTIC EXERCISES: CPT

## 2024-12-04 NOTE — FLOWSHEET NOTE
[] [facility]  Outpatient Rehabilitation &  Therapy  [address]  [phone]  [phone] [] [facility]  Outpatient Rehabilitation &  Therapy  [address]  P: [phone]  F: [phone] [x] [facility]  Outpatient Rehabilitation &  Therapy  [address]  P: [phone]  F: [phone] [] [facility]  Outpatient Rehabilitation &  Therapy  [address]  [phone]  [phone] [] [facility]  Outpatient Rehabilitation &  Therapy  [address]   P: [phone]  F: [phone]  [] [facility]  Outpatient Rehabilitation &  Therapy  [address]  P: [phone]  F: [phone] [] [facility]  Outpatient Rehabilitation &  Therapy  [address]  P: [phone]  F: [phone] [] [facility]  Outpatient Rehabilitation &  Therapy  [address]  P: [phone]  F: [phone] [] [facility]  Outpatient Rehabilitation &  Therapy  [address]  P: [phone]  F: [phone]  [] [facility] Outpatient Rehabilitation &  Therapy  [address]  P: [phone]  F: [phone]     Physical Therapy Daily Treatment    Date:  2024  Patient Name:  Katja Isidro    :  1945  MRN: [id number]  Physician: Moise Brambila MD                   Insurance: [facility] Medicare - Visits BMN - No Hard Max - Auth After Eval, Received auth approval for 20vs of PT from 10/9-24 auth#[id number]   Medical Diagnosis: M16.12 - Primary OA of L hip               Rehab Codes: M25.55, M25.65, R26.2  Onset date: 10/1/24               Next 's appt.: prn  Visit# / total visits:     Cancels/No Shows: 0    Subjective:   Pain:  [] Yes  [x] No Location: L hip  Pain Rating: (0-10 scale) 0/10  Pain altered Tx:  [x] No  [] Yes

## 2024-12-10 ENCOUNTER — HOSPITAL ENCOUNTER (OUTPATIENT)
Dept: PHYSICAL THERAPY | Facility: CLINIC | Age: 79
Setting detail: THERAPIES SERIES
Discharge: HOME | End: 2024-12-10
Payer: MEDICARE

## 2024-12-10 PROCEDURE — 97110 THERAPEUTIC EXERCISES: CPT

## 2024-12-10 NOTE — FLOWSHEET NOTE
[] [facility]  Outpatient Rehabilitation &  Therapy  [address]  [phone]  [phone] [] [facility]  Outpatient Rehabilitation &  Therapy  [address]  P: [phone]  F: [phone] [x] [facility]  Outpatient Rehabilitation &  Therapy  [address]  P: [phone]  F: [phone] [] [facility]  Outpatient Rehabilitation &  Therapy  [address]  [phone]  [phone] [] [facility]  Outpatient Rehabilitation &  Therapy  [address]   P: [phone]  F: [phone]  [] [facility]  Outpatient Rehabilitation &  Therapy  [address]  P: [phone]  F: [phone] [] [facility]  Outpatient Rehabilitation &  Therapy  [address]  P: [phone]  F: [phone] [] [facility]  Outpatient Rehabilitation &  Therapy  [address]  P: [phone]  F: [phone] [] [facility]  Outpatient Rehabilitation &  Therapy  [address]  P: [phone]  F: [phone]  [] [facility] Outpatient Rehabilitation &  Therapy  [address]  P: [phone]  F: [phone]     Physical Therapy Daily Treatment    Date:  12/10/2024  Patient Name:  Katja Isidro    :  1945  MRN: [id number]  Physician: Moise Brambila MD                   Insurance: [facility] Medicare - Visits BMN - No Hard Max - Auth After Eval, Received auth approval for 20vs of PT from 10/9-24 auth#[id number]   Medical Diagnosis: M16.12 - Primary OA of L hip               Rehab Codes: M25.55, M25.65, R26.2  Onset date: 10/1/24               Next Dr's appt.: prn  Visit# / total visits:     Cancels/No Shows: 0    Subjective:   Pain:  [] Yes  [x] No Location: L hip  Pain Rating: (0-10 scale) 0/10  Pain altered Tx:  [x] No  [] Yes

## 2024-12-19 ENCOUNTER — HOSPITAL ENCOUNTER (OUTPATIENT)
Dept: PHYSICAL THERAPY | Facility: CLINIC | Age: 79
Setting detail: THERAPIES SERIES
Discharge: HOME | End: 2024-12-19
Payer: MEDICARE

## 2024-12-19 PROCEDURE — 97110 THERAPEUTIC EXERCISES: CPT

## 2024-12-19 NOTE — FLOWSHEET NOTE
[] [facility]  Outpatient Rehabilitation &  Therapy  [address]  [phone]  [phone] [] [facility]  Outpatient Rehabilitation &  Therapy  [address]  P: [phone]  F: [phone] [x] [facility]  Outpatient Rehabilitation &  Therapy  [address]  P: [phone]  F: [phone] [] [facility]  Outpatient Rehabilitation &  Therapy  [address]  [phone]  [phone] [] [facility]  Outpatient Rehabilitation &  Therapy  [address]   P: [phone]  F: [phone]  [] [facility]  Outpatient Rehabilitation &  Therapy  [address]  P: [phone]  F: [phone] [] [facility]  Outpatient Rehabilitation &  Therapy  [address]  P: [phone]  F: [phone] [] [facility]  Outpatient Rehabilitation &  Therapy  [address]  P: [phone]  F: [phone] [] [facility]  Outpatient Rehabilitation &  Therapy  [address]  P: [phone]  F: [phone]  [] [facility] Outpatient Rehabilitation &  Therapy  [address]  P: [phone]  F: [phone]     Physical Therapy Daily Treatment    Date:  2024  Patient Name:  Katja Isidro    :  1945  MRN: [id number]  Physician: Mosie Brambila MD                   Insurance: [facility] Medicare - Visits BMN - No Hard Max - Auth After Eval, Received auth approval for 20vs of PT from 10/9-24 auth#[id number]   [employer] approved 4 VS  - 25 Auth #[id number]  Medical Diagnosis: M16.12 - Primary OA of L hip               Rehab Codes: M25.55, M25.65, R26.2  Onset date: 10/1/24               Next 's appt.: prn  Visit# / total visits: 15/20 (1/ of new auth)    Cancels/No Shows: 0    Subjective:   Pain:  [] Yes  [x] No Location: L

## 2024-12-26 ENCOUNTER — HOSPITAL ENCOUNTER (OUTPATIENT)
Dept: PHYSICAL THERAPY | Facility: CLINIC | Age: 79
Setting detail: THERAPIES SERIES
Discharge: HOME | End: 2024-12-26
Payer: MEDICARE

## 2024-12-26 PROCEDURE — 97110 THERAPEUTIC EXERCISES: CPT

## 2024-12-26 NOTE — FLOWSHEET NOTE
[] [facility]  Outpatient Rehabilitation &  Therapy  [address]  [phone]  [phone] [] [facility]  Outpatient Rehabilitation &  Therapy  [address]  P: [phone]  F: [phone] [x] [facility]  Outpatient Rehabilitation &  Therapy  [address]  P: [phone]  F: [phone] [] [facility]  Outpatient Rehabilitation &  Therapy  [address]  [phone]  [phone] [] [facility]  Outpatient Rehabilitation &  Therapy  [address]   P: [phone]  F: [phone]  [] [facility]  Outpatient Rehabilitation &  Therapy  [address]  P: [phone]  F: [phone] [] [facility]  Outpatient Rehabilitation &  Therapy  [address]  P: [phone]  F: [phone] [] [facility]  Outpatient Rehabilitation &  Therapy  [address]  P: [phone]  F: [phone] [] [facility]  Outpatient Rehabilitation &  Therapy  [address]  P: [phone]  F: [phone]  [] [facility] Outpatient Rehabilitation &  Therapy  [address]  P: [phone]  F: [phone]     Physical Therapy Daily Treatment    Date:  2024  Patient Name:  Katja Isidro    :  1945  MRN: [id number]  Physician: Moise Brambila MD                   Insurance: [facility] Medicare - Visits BMN - No Hard Max - Auth After Eval, Received auth approval for 20vs of PT from 10/9-24 auth#[id number]   [employer] approved 4 VS  - 25 Auth #[id number]  Medical Diagnosis: M16.12 - Primary OA of L hip               Rehab Codes: M25.55, M25.65, R26.2  Onset date: 10/1/24               Next 's appt.: prn  Visit# / total visits:  (2/4 of new auth)    Cancels/No Shows: 0    Subjective:   Pain:  [] Yes  [x] No Location: L

## 2024-12-31 ENCOUNTER — HOSPITAL ENCOUNTER (OUTPATIENT)
Dept: PHYSICAL THERAPY | Facility: CLINIC | Age: 79
Setting detail: THERAPIES SERIES
Discharge: HOME | End: 2024-12-31
Payer: MEDICARE

## 2024-12-31 PROCEDURE — 97110 THERAPEUTIC EXERCISES: CPT

## 2024-12-31 NOTE — FLOWSHEET NOTE
hip  Pain Rating: (0-10 scale) 0/10  Pain altered Tx:  [x] No  [] Yes  Action:  Comments: Patient states that she continues do well and has not had any flare ups in the hip since last visit. She remains compliant with HEP completion at home and is comfortable with her next visit being her last.     Objective:  Test Measurements:  Hip Strength - Eval  Ext: 3+/5 bilat  ABD: L 3/5, R 4-/5    Hip Strength - Note 12/31  Ext: L 4/5, R 4-/5  ABD: L 3+/5, R 4/5    HS Length - Eval  L: 70 deg  R: 65 deg    HS Length - Note 12/31  L: 75 deg  R: 69 deg    Modalities:   Precautions [x] No  [] Yes:  Exercises:  Exercise Reps/ Time Weight/ Level completed Comments   Nustep 7' L5 x           Mat       Piriformis S 2x60\"   x     Figure 4 S 2x60\"   x     Supine hip IR/ER x15      LTR 5x10\"      Hip flexor stretch 90\"   EOB   Supine bridge 2x10x5\" lime  W/ glut iso   TA march x20      TA SLR 2x10      Clamshells 2x10 lime     SL hip ABD 2x10 lime             Gym       SB calf S 2x60\"  x    HS stool stretch 2x60\"e  x    3 way hip 2x10 lime x Bilat   Tband sidestepping 2L ea Lime/blue x Around feet   SLS 2x30\"e  x Min VC to press big toe into floor to inc stability    Squat taps to chair 3x10 Lime at knees x Chair + 1 pillow; each set reducing external support (x2, x1, x0 UE)   Step ups - fwd x20e 8\" x    Step ups lateral  x20e  8\" x    Tandem stance 2x30\"e      Mini lunges alt 2x10  x    Paloff press 2x10 green cord            Manual: PRN     Specific Instructions for next treatment: Last session; prepare for DC      Treatment Charges: Mins Units   []  Modalities     [x]  Ther Exercise 43 3   []  Manual Therapy     []  Ther Activities     []  Neuro Re-ed     []  Vasocompression     [] Gait     []  Other     Total Billable time 43 3       Assessment: [x] Progressing toward goals. Patient displayed good tolerance to treatment session this date. Able to resume step ups fwd and lat today with patient able to complete with x0 UE support

## 2025-01-02 ENCOUNTER — HOSPITAL ENCOUNTER (OUTPATIENT)
Dept: PHYSICAL THERAPY | Facility: CLINIC | Age: 80
Setting detail: THERAPIES SERIES
Discharge: HOME | End: 2025-01-02
Payer: MEDICARE

## 2025-01-02 PROCEDURE — 97110 THERAPEUTIC EXERCISES: CPT

## 2025-01-02 NOTE — FLOWSHEET NOTE
[] [facility]  Outpatient Rehabilitation &  Therapy  [address]  [phone]  [phone] [] [facility]  Outpatient Rehabilitation &  Therapy  [address]  P: [phone]  F: [phone] [x] [facility]  Outpatient Rehabilitation &  Therapy  [address]  P: [phone]  F: [phone] [] [facility]  Outpatient Rehabilitation &  Therapy  [address]  [phone]  [phone] [] [facility]  Outpatient Rehabilitation &  Therapy  [address]   P: [phone]  F: [phone]  [] [facility]  Outpatient Rehabilitation &  Therapy  [address]  P: [phone]  F: [phone] [] [facility]  Outpatient Rehabilitation &  Therapy  [address]  P: [phone]  F: [phone] [] [facility]  Outpatient Rehabilitation &  Therapy  [address]  P: [phone]  F: [phone] [] [facility]  Outpatient Rehabilitation &  Therapy  [address]  P: [phone]  F: [phone]  [] [facility] Outpatient Rehabilitation &  Therapy  [address]  P: [phone]  F: [phone]     Physical Therapy Daily Treatment    Date:  2025  Patient Name:  Katja Isidro    :  1945  MRN: [id number]  Physician: Moise Brambila MD                   Insurance: [facility] Medicare - Visits BMN - No Hard Max - Auth After Eval, Received auth approval for 20vs of PT from 10/9-24 auth#[id number]   [employer] approved 4 VS  - 25 Auth #[id number]  Medical Diagnosis: M16.12 - Primary OA of L hip               Rehab Codes: M25.55, M25.65, R26.2  Onset date: 10/1/24               Next 's appt.: prn  Visit# / total visits:  (4/ of new auth)    Cancels/No Shows: 0    Subjective:   Pain:  [] Yes  [x] No Location: L hip

## 2025-02-03 ENCOUNTER — CLINICAL DOCUMENTATION (OUTPATIENT)
Dept: PHYSICAL THERAPY | Facility: CLINIC | Age: 80
End: 2025-02-03

## 2025-02-03 NOTE — DISCHARGE SUMMARY
[] [facility]  Outpatient Rehabilitation &  Therapy  [address]  [phone]  [phone] [] [facility]  Outpatient Rehabilitation &  Therapy  [address]  P: [phone]  F: [phone] [x] [facility]  Outpatient Rehabilitation &  Therapy  [address]  P: [phone]  F: [phone] [] [facility]  Outpatient Rehabilitation &  Therapy  [address]  [phone]  [phone] [] [facility]  Outpatient Rehabilitation &  Therapy  [address]   P: [phone]  F: [phone]  [] [facility]  Outpatient Rehabilitation &  Therapy  [address]  P: [phone]  F: [phone] [] [facility]  Outpatient Rehabilitation &  Therapy  [address]  P: [phone]  F: [phone] [] [facility]  Outpatient Rehabilitation &  Therapy  [address]  P: [phone]  F: [phone] [] [facility]  Outpatient Rehabilitation &  Therapy  [address]  P: [phone]  F: [phone]  [] [facility] Outpatient Rehabilitation &  Therapy  [address]  P: [phone]  F: [phone]     Physical Therapy Discharge Note    Date: 2/3/2025      Patient: Katja Isidro  : 1945  MRN: [id number]    Physician: Moise Brambila MD                   Insurance: [facility] Medicare - Visits BMN - No Hard Max - Auth After Eval  Medical Diagnosis: M16.12 - Primary OA of L hip               Rehab Codes: M25.55, M25.65, R26.2  Onset date: 10/1/24               Next 's appt.: prn  Visit# / total visits:               Cancels/No Shows: 0  Date of initial visit: 10/9/24                Date of final visit: 25      Subjective:    Objective:    Assessment: Patient met most goals during this POC and felt able to be DC to HEP at